# Patient Record
Sex: FEMALE | Race: WHITE | NOT HISPANIC OR LATINO | Employment: OTHER | URBAN - METROPOLITAN AREA
[De-identification: names, ages, dates, MRNs, and addresses within clinical notes are randomized per-mention and may not be internally consistent; named-entity substitution may affect disease eponyms.]

---

## 2017-05-24 ENCOUNTER — OFFICE VISIT - RIVER FALLS (OUTPATIENT)
Dept: FAMILY MEDICINE | Facility: CLINIC | Age: 67
End: 2017-05-24

## 2017-05-24 ASSESSMENT — MIFFLIN-ST. JEOR: SCORE: 1059.06

## 2017-06-05 ENCOUNTER — OFFICE VISIT - RIVER FALLS (OUTPATIENT)
Dept: FAMILY MEDICINE | Facility: CLINIC | Age: 67
End: 2017-06-05

## 2017-06-05 ASSESSMENT — MIFFLIN-ST. JEOR: SCORE: 1049.08

## 2017-10-18 ENCOUNTER — OFFICE VISIT - RIVER FALLS (OUTPATIENT)
Dept: FAMILY MEDICINE | Facility: CLINIC | Age: 67
End: 2017-10-18

## 2017-10-18 ASSESSMENT — MIFFLIN-ST. JEOR: SCORE: 1051.8

## 2017-10-23 ENCOUNTER — OFFICE VISIT - RIVER FALLS (OUTPATIENT)
Dept: FAMILY MEDICINE | Facility: CLINIC | Age: 67
End: 2017-10-23

## 2017-10-23 ENCOUNTER — COMMUNICATION - RIVER FALLS (OUTPATIENT)
Dept: FAMILY MEDICINE | Facility: CLINIC | Age: 67
End: 2017-10-23

## 2017-10-23 ASSESSMENT — MIFFLIN-ST. JEOR: SCORE: 1045.45

## 2017-10-30 ENCOUNTER — OFFICE VISIT - RIVER FALLS (OUTPATIENT)
Dept: FAMILY MEDICINE | Facility: CLINIC | Age: 67
End: 2017-10-30

## 2017-10-30 ASSESSMENT — MIFFLIN-ST. JEOR: SCORE: 1046.36

## 2017-11-01 ENCOUNTER — OFFICE VISIT - RIVER FALLS (OUTPATIENT)
Dept: FAMILY MEDICINE | Facility: CLINIC | Age: 67
End: 2017-11-01

## 2018-06-06 ENCOUNTER — OFFICE VISIT - RIVER FALLS (OUTPATIENT)
Dept: FAMILY MEDICINE | Facility: CLINIC | Age: 68
End: 2018-06-06

## 2018-06-06 ASSESSMENT — MIFFLIN-ST. JEOR: SCORE: 1059.06

## 2018-07-02 ENCOUNTER — OFFICE VISIT - RIVER FALLS (OUTPATIENT)
Dept: FAMILY MEDICINE | Facility: CLINIC | Age: 68
End: 2018-07-02

## 2018-07-02 ASSESSMENT — MIFFLIN-ST. JEOR: SCORE: 1054.52

## 2018-07-20 ENCOUNTER — AMBULATORY - RIVER FALLS (OUTPATIENT)
Dept: FAMILY MEDICINE | Facility: CLINIC | Age: 68
End: 2018-07-20

## 2018-07-21 LAB
CHOLEST SERPL-MCNC: 213 MG/DL
CHOLEST/HDLC SERPL: 2.4 {RATIO}
GLUCOSE BLD-MCNC: 96 MG/DL (ref 65–99)
HDLC SERPL-MCNC: 88 MG/DL
LDLC SERPL CALC-MCNC: 103 MG/DL
NONHDLC SERPL-MCNC: 125 MG/DL
TRIGL SERPL-MCNC: 119 MG/DL

## 2018-08-02 ENCOUNTER — OFFICE VISIT - RIVER FALLS (OUTPATIENT)
Dept: FAMILY MEDICINE | Facility: CLINIC | Age: 68
End: 2018-08-02

## 2018-08-02 ASSESSMENT — MIFFLIN-ST. JEOR: SCORE: 1054.52

## 2018-12-04 ENCOUNTER — OFFICE VISIT - RIVER FALLS (OUTPATIENT)
Dept: FAMILY MEDICINE | Facility: CLINIC | Age: 68
End: 2018-12-04

## 2018-12-04 ASSESSMENT — MIFFLIN-ST. JEOR: SCORE: 1049.98

## 2019-12-17 ENCOUNTER — COMMUNICATION - RIVER FALLS (OUTPATIENT)
Dept: FAMILY MEDICINE | Facility: CLINIC | Age: 69
End: 2019-12-17

## 2020-01-02 ENCOUNTER — COMMUNICATION - RIVER FALLS (OUTPATIENT)
Dept: FAMILY MEDICINE | Facility: CLINIC | Age: 70
End: 2020-01-02

## 2020-01-20 ENCOUNTER — COMMUNICATION - RIVER FALLS (OUTPATIENT)
Dept: FAMILY MEDICINE | Facility: CLINIC | Age: 70
End: 2020-01-20

## 2020-01-20 ENCOUNTER — OFFICE VISIT - RIVER FALLS (OUTPATIENT)
Dept: FAMILY MEDICINE | Facility: CLINIC | Age: 70
End: 2020-01-20

## 2020-01-20 LAB — DEPRECATED S PYO AG THROAT QL EIA: NOT DETECTED

## 2020-01-20 ASSESSMENT — MIFFLIN-ST. JEOR: SCORE: 1071.76

## 2020-02-04 ENCOUNTER — OFFICE VISIT - RIVER FALLS (OUTPATIENT)
Dept: FAMILY MEDICINE | Facility: CLINIC | Age: 70
End: 2020-02-04

## 2020-03-05 ENCOUNTER — AMBULATORY - RIVER FALLS (OUTPATIENT)
Dept: FAMILY MEDICINE | Facility: CLINIC | Age: 70
End: 2020-03-05

## 2020-03-06 ENCOUNTER — COMMUNICATION - RIVER FALLS (OUTPATIENT)
Dept: FAMILY MEDICINE | Facility: CLINIC | Age: 70
End: 2020-03-06

## 2020-03-06 LAB
CHOLEST SERPL-MCNC: 221 MG/DL
CHOLEST/HDLC SERPL: 2.5 {RATIO}
FECAL FAT, QUALITATIVE: NORMAL
GLUCOSE BLD-MCNC: 101 MG/DL (ref 65–99)
HDLC SERPL-MCNC: 87 MG/DL
LDLC SERPL CALC-MCNC: 113 MG/DL
NONHDLC SERPL-MCNC: 134 MG/DL
TRIGL SERPL-MCNC: 100 MG/DL

## 2020-05-05 ENCOUNTER — OFFICE VISIT - RIVER FALLS (OUTPATIENT)
Dept: FAMILY MEDICINE | Facility: CLINIC | Age: 70
End: 2020-05-05

## 2020-06-24 ENCOUNTER — OFFICE VISIT - RIVER FALLS (OUTPATIENT)
Dept: FAMILY MEDICINE | Facility: CLINIC | Age: 70
End: 2020-06-24

## 2020-06-24 ASSESSMENT — MIFFLIN-ST. JEOR: SCORE: 1030.93

## 2020-11-05 ENCOUNTER — OFFICE VISIT - RIVER FALLS (OUTPATIENT)
Dept: FAMILY MEDICINE | Facility: CLINIC | Age: 70
End: 2020-11-05

## 2020-11-05 ENCOUNTER — AMBULATORY - RIVER FALLS (OUTPATIENT)
Dept: FAMILY MEDICINE | Facility: CLINIC | Age: 70
End: 2020-11-05

## 2020-11-08 LAB — SARS-COV-2 RNA SPEC QL NAA+PROBE: NOT DETECTED

## 2020-11-27 ENCOUNTER — AMBULATORY - RIVER FALLS (OUTPATIENT)
Dept: FAMILY MEDICINE | Facility: CLINIC | Age: 70
End: 2020-11-27

## 2021-02-04 ENCOUNTER — OFFICE VISIT - RIVER FALLS (OUTPATIENT)
Dept: FAMILY MEDICINE | Facility: CLINIC | Age: 71
End: 2021-02-04

## 2021-02-04 ASSESSMENT — MIFFLIN-ST. JEOR: SCORE: 1051.8

## 2021-07-26 ENCOUNTER — OFFICE VISIT - RIVER FALLS (OUTPATIENT)
Dept: FAMILY MEDICINE | Facility: CLINIC | Age: 71
End: 2021-07-26

## 2021-07-26 ASSESSMENT — MIFFLIN-ST. JEOR: SCORE: 1040.91

## 2021-08-18 ENCOUNTER — AMBULATORY - RIVER FALLS (OUTPATIENT)
Dept: FAMILY MEDICINE | Facility: CLINIC | Age: 71
End: 2021-08-18

## 2021-08-19 LAB — FECAL FAT, QUALITATIVE: NORMAL

## 2021-08-20 ENCOUNTER — COMMUNICATION - RIVER FALLS (OUTPATIENT)
Dept: FAMILY MEDICINE | Facility: CLINIC | Age: 71
End: 2021-08-20

## 2021-08-23 ENCOUNTER — OFFICE VISIT - RIVER FALLS (OUTPATIENT)
Dept: FAMILY MEDICINE | Facility: CLINIC | Age: 71
End: 2021-08-23

## 2021-08-23 ASSESSMENT — MIFFLIN-ST. JEOR: SCORE: 1036.83

## 2021-10-18 ENCOUNTER — OFFICE VISIT - RIVER FALLS (OUTPATIENT)
Dept: FAMILY MEDICINE | Facility: CLINIC | Age: 71
End: 2021-10-18

## 2021-10-18 ASSESSMENT — MIFFLIN-ST. JEOR: SCORE: 1041.37

## 2021-12-16 ENCOUNTER — OFFICE VISIT - RIVER FALLS (OUTPATIENT)
Dept: FAMILY MEDICINE | Facility: CLINIC | Age: 71
End: 2021-12-16

## 2021-12-16 ASSESSMENT — MIFFLIN-ST. JEOR: SCORE: 1068.13

## 2022-02-12 VITALS
WEIGHT: 127.8 LBS | WEIGHT: 128 LBS | DIASTOLIC BLOOD PRESSURE: 59 MMHG | BODY MASS INDEX: 22.32 KG/M2 | TEMPERATURE: 97.8 F | RESPIRATION RATE: 16 BRPM | SYSTOLIC BLOOD PRESSURE: 95 MMHG | HEART RATE: 56 BPM | HEART RATE: 62 BPM | OXYGEN SATURATION: 98 % | TEMPERATURE: 97.4 F | HEIGHT: 64 IN | BODY MASS INDEX: 21.82 KG/M2 | SYSTOLIC BLOOD PRESSURE: 122 MMHG | DIASTOLIC BLOOD PRESSURE: 60 MMHG

## 2022-02-12 VITALS
TEMPERATURE: 97.6 F | HEART RATE: 56 BPM | HEIGHT: 64 IN | DIASTOLIC BLOOD PRESSURE: 68 MMHG | BODY MASS INDEX: 21.07 KG/M2 | WEIGHT: 122 LBS | SYSTOLIC BLOOD PRESSURE: 116 MMHG | BODY MASS INDEX: 21.27 KG/M2 | WEIGHT: 123.4 LBS | SYSTOLIC BLOOD PRESSURE: 136 MMHG | WEIGHT: 122 LBS | DIASTOLIC BLOOD PRESSURE: 68 MMHG | DIASTOLIC BLOOD PRESSURE: 62 MMHG | HEART RATE: 52 BPM | RESPIRATION RATE: 16 BRPM | SYSTOLIC BLOOD PRESSURE: 120 MMHG | BODY MASS INDEX: 20.86 KG/M2 | DIASTOLIC BLOOD PRESSURE: 64 MMHG | SYSTOLIC BLOOD PRESSURE: 111 MMHG | HEIGHT: 64 IN | HEART RATE: 64 BPM | BODY MASS INDEX: 20.83 KG/M2 | TEMPERATURE: 97.2 F | TEMPERATURE: 97.4 F | WEIGHT: 122.2 LBS | HEIGHT: 64 IN | HEART RATE: 51 BPM

## 2022-02-12 VITALS
HEART RATE: 64 BPM | HEART RATE: 62 BPM | SYSTOLIC BLOOD PRESSURE: 108 MMHG | WEIGHT: 120.1 LBS | SYSTOLIC BLOOD PRESSURE: 110 MMHG | BODY MASS INDEX: 20.51 KG/M2 | TEMPERATURE: 97.1 F | HEIGHT: 64 IN | DIASTOLIC BLOOD PRESSURE: 66 MMHG | BODY MASS INDEX: 20.67 KG/M2 | HEIGHT: 64 IN | TEMPERATURE: 97.7 F | WEIGHT: 121.1 LBS | DIASTOLIC BLOOD PRESSURE: 64 MMHG

## 2022-02-12 VITALS
WEIGHT: 123 LBS | HEART RATE: 57 BPM | BODY MASS INDEX: 21 KG/M2 | SYSTOLIC BLOOD PRESSURE: 125 MMHG | TEMPERATURE: 97.2 F | HEIGHT: 64 IN | DIASTOLIC BLOOD PRESSURE: 77 MMHG

## 2022-02-12 VITALS
DIASTOLIC BLOOD PRESSURE: 63 MMHG | BODY MASS INDEX: 20.96 KG/M2 | SYSTOLIC BLOOD PRESSURE: 96 MMHG | HEART RATE: 56 BPM | HEIGHT: 64 IN | TEMPERATURE: 97.7 F | WEIGHT: 122.8 LBS

## 2022-02-12 VITALS
DIASTOLIC BLOOD PRESSURE: 62 MMHG | HEIGHT: 64 IN | BODY MASS INDEX: 20.66 KG/M2 | SYSTOLIC BLOOD PRESSURE: 110 MMHG | TEMPERATURE: 98 F | HEART RATE: 68 BPM | WEIGHT: 121 LBS

## 2022-02-12 VITALS
DIASTOLIC BLOOD PRESSURE: 60 MMHG | TEMPERATURE: 96.9 F | SYSTOLIC BLOOD PRESSURE: 100 MMHG | WEIGHT: 125 LBS | BODY MASS INDEX: 21.34 KG/M2 | HEIGHT: 64 IN | HEART RATE: 60 BPM

## 2022-02-12 VITALS
HEART RATE: 60 BPM | SYSTOLIC BLOOD PRESSURE: 100 MMHG | TEMPERATURE: 97 F | DIASTOLIC BLOOD PRESSURE: 60 MMHG | BODY MASS INDEX: 21.07 KG/M2 | HEIGHT: 64 IN | WEIGHT: 123.4 LBS

## 2022-02-12 VITALS
DIASTOLIC BLOOD PRESSURE: 58 MMHG | SYSTOLIC BLOOD PRESSURE: 98 MMHG | BODY MASS INDEX: 20.28 KG/M2 | WEIGHT: 118.8 LBS | HEART RATE: 60 BPM | HEIGHT: 64 IN | TEMPERATURE: 97.8 F

## 2022-02-12 VITALS
TEMPERATURE: 97.8 F | HEIGHT: 64 IN | DIASTOLIC BLOOD PRESSURE: 72 MMHG | TEMPERATURE: 97.4 F | TEMPERATURE: 97.4 F | BODY MASS INDEX: 21.34 KG/M2 | WEIGHT: 124 LBS | HEIGHT: 64 IN | WEIGHT: 124 LBS | HEART RATE: 56 BPM | BODY MASS INDEX: 21.17 KG/M2 | SYSTOLIC BLOOD PRESSURE: 120 MMHG | BODY MASS INDEX: 21.17 KG/M2 | HEART RATE: 58 BPM | HEIGHT: 64 IN | SYSTOLIC BLOOD PRESSURE: 122 MMHG | DIASTOLIC BLOOD PRESSURE: 70 MMHG | SYSTOLIC BLOOD PRESSURE: 124 MMHG | DIASTOLIC BLOOD PRESSURE: 64 MMHG | HEART RATE: 58 BPM | WEIGHT: 125 LBS

## 2022-02-12 VITALS
BODY MASS INDEX: 21.68 KG/M2 | HEART RATE: 68 BPM | OXYGEN SATURATION: 97 % | HEIGHT: 64 IN | DIASTOLIC BLOOD PRESSURE: 60 MMHG | WEIGHT: 127 LBS | TEMPERATURE: 97 F | SYSTOLIC BLOOD PRESSURE: 108 MMHG

## 2022-02-12 VITALS — BODY MASS INDEX: 22.32 KG/M2 | HEIGHT: 64 IN

## 2022-02-15 NOTE — NURSING NOTE
Comprehensive Intake Entered On:  1/20/2020 8:55 AM CST    Performed On:  1/20/2020 8:51 AM CST by Khalida Dailey CMA               Summary   Chief Complaint :   c/o  Sore throat with chills x 1 week   Menstrual Status :   Postmenopausal   Weight Measured :   127.8 lb(Converted to: 127 lb 13 oz, 57.97 kg)    Height Measured :   63.5 in(Converted to: 5 ft 3 in, 161.29 cm)    Body Mass Index :   22.28 kg/m2   Body Surface Area :   1.61 m2   Systolic Blood Pressure :   122 mmHg   Diastolic Blood Pressure :   60 mmHg   Mean Arterial Pressure :   81 mmHg   Peripheral Pulse Rate :   62 bpm   Temperature Tympanic :   97.8 DegF(Converted to: 36.6 DegC)  (LOW)    Race :      Ethnicity :   Not  or    Khalida Dailey CMA - 1/20/2020 8:51 AM CST   Health Status   Allergies Verified? :   Yes   Medication History Verified? :   Yes   Pre-Visit Planning Status :   Completed   Tobacco Use? :   Never smoker   Khalida Dailey CMA - 1/20/2020 8:51 AM CST   Consents   Consent for Immunization Exchange :   Consent Granted   Consent for Immunizations to Providers :   Consent Granted   Khalida Dailey CMA - 1/20/2020 8:51 AM CST   Meds / Allergies   (As Of: 1/20/2020 8:55:27 AM CST)   Allergies (Active)   No Known Medication Allergies  Estimated Onset Date:   Unspecified ; Created By:   Ernesto Sparks CMA; Reaction Status:   Active ; Category:   Drug ; Substance:   No Known Medication Allergies ; Type:   Allergy ; Updated By:   Ernesto Sparsk CMA; Reviewed Date:   8/2/2018 4:44 PM CDT        Medication List   (As Of: 1/20/2020 8:55:27 AM CST)   Prescription/Discharge Order    conjugated estrogens  :   conjugated estrogens ; Status:   Prescribed ; Ordered As Mnemonic:   conjugated estrogens 0.3 mg oral tablet ; Simple Display Line:   0.3 mg, 1 tab(s), PO, Daily, 90 tab(s), 0 Refill(s) ; Ordering Provider:   Kirill Nj MD; Catalog Code:   conjugated estrogens ; Order Dt/Tm:   1/2/2020  Off the floor--will check back later 4:56:50 PM CST          conjugated estrogens topical  :   conjugated estrogens topical ; Status:   Prescribed ; Ordered As Mnemonic:   Premarin 0.625 mg/g vaginal cream with applicator ; Simple Display Line:   1 g, VAG, qPM, uses about 2x a week, 42.5 g, 3 Refill(s) ; Ordering Provider:   Kirill Nj MD; Catalog Code:   conjugated estrogens topical ; Order Dt/Tm:   6/6/2018 1:04:08 PM CDT          estradiol-levonorgestrel  :   estradiol-levonorgestrel ; Status:   Prescribed ; Ordered As Mnemonic:   estradiol-levonorgestrel 0.045 mg-0.015 mg/24 hr transdermal film, extended release ; Simple Display Line:   1 patch(es), top, qweek, fill if decides to change from oral, 12 patch(es), 3 Refill(s) ; Ordering Provider:   Kirill Nj MD; Catalog Code:   estradiol-levonorgestrel ; Order Dt/Tm:   6/6/2018 1:02:20 PM CDT          fluticasone nasal  :   fluticasone nasal ; Status:   Prescribed ; Ordered As Mnemonic:   fluticasone 50 mcg/inh nasal spray ; Simple Display Line:   2 spray(s), Nasal, daily, for 30 day(s), 16 gm, 0 Refill(s) ; Ordering Provider:   Kirill Nj MD; Catalog Code:   fluticasone nasal ; Order Dt/Tm:   12/17/2019 3:13:16 PM CST          ibuprofen  :   ibuprofen ; Status:   Prescribed ; Ordered As Mnemonic:   ibuprofen 600 mg oral tablet ; Simple Display Line:   600 mg, 1 tab(s), po, q8 hrs, PRN: as needed for pain, 45 tab(s), 2 Refill(s) ; Ordering Provider:   Kirill Nj MD; Catalog Code:   ibuprofen ; Order Dt/Tm:   5/24/2017 3:03:55 PM CDT          medroxyPROGESTERone  :   medroxyPROGESTERone ; Status:   Prescribed ; Ordered As Mnemonic:   Provera 10 mg oral tablet ; Simple Display Line:   10 mg, 1 tab(s), po, daily, as directed by provider, 12 tab(s), 4 Refill(s) ; Ordering Provider:   Kirill Nj MD; Catalog Code:   medroxyPROGESTERone ; Order Dt/Tm:   11/17/2018 8:37:11 AM CST          metroNIDAZOLE topical  :   metroNIDAZOLE topical ; Status:   Prescribed ; Ordered As Mnemonic:    metroNIDAZOLE 0.75% topical gel ; Simple Display Line:   1 annie, TOP, daily, 45 g, 3 Refill(s) ; Ordering Provider:   Kirill Nj MD; Catalog Code:   metroNIDAZOLE topical ; Order Dt/Tm:   6/6/2018 1:15:04 PM CDT          Miscellaneous Rx Supply  :   Miscellaneous Rx Supply ; Status:   Prescribed ; Ordered As Mnemonic:   Orthotics ; Simple Display Line:   See Instructions, Use as directed, 1 EA, 0 Refill(s) ; Ordering Provider:   Kirill Nj MD; Catalog Code:   Miscellaneous Rx Supply ; Order Dt/Tm:   5/24/2017 3:07:47 PM CDT

## 2022-02-15 NOTE — PROGRESS NOTES
Patient:   LUCHO GOLD            MRN: [id number]            FIN: [id number]               Age:   71 years     Sex:  Female     :  1950   Associated Diagnoses:   Musculoskeletal pain   Author:   Kirill Nj MD      Visit Information      Date of Service: 2021 01:33 pm  Performing Location: [facility]  Encounter#: [id number]      Primary Care Provider (PCP):  Kirill Nj MD    NPI# [id number]      Referring Provider:  Kirill Nj MD    NPI# [id number]      Chief Complaint   2021 1:41 PM CST   referral for PT for right hammstring pain        History of Present Illness   Has high hamstring tendinopathy. Symptoms restarted with overdoing the running circuit. Physical therapy has helped in past. Had been doing well since 2018. Usually takes a couple months with physical therapy to improve.    Right buttock area with pain started 2017 with two months of biking and skiing. Had been doing strengthening exercises past month without some improvement. Had left piriformis syndrome in the past which improved with physical therapy.  Pain occurs with stretching. Running does trigger it as well. Able to run slowly. Feels it when bends forward.  Symptoms resolved last summer. Symptoms recurred two months with running even though had been running all year.      Review of Systems   Musculoskeletal:  No muscle weakness.    Neurologic:  No numbness, No tingling.    No incontinence      Health Status   Allergies:    Allergic Reactions (Selected)  No Known Medication Allergies   Medications:  (Selected)   Prescriptions  Prescribed  Orthotics: Orthotics, See Instructions, Instructions: Use as directed, Supply, # 1 EA, 0 Refill(s), Type: Maintenance  fluticasone 50 mcg/inh nasal spray: = 2 spray(s), Nasal, daily, # 16 gm, 11 Refill(s), Type: Maintenance, Pharmacy: [facility], due for visit for further refills, 2 spray(s) Nasal daily,x30 day(s), 63.5, in, 10/18/21 12:44:00 CDT,  Height Measured, 121.1, lb, 10/18/21 12:44:00 HILDATKan...  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  Comments:  6/6/2013 4:51 PM HILDAT - Marleen Sanz  Low risk for cervical cancer. Repeat 3 yrs (age 65), then if neg may stop pap screening.  Bone density scan (SNOMED CT 770512362) on 4/20/2010 at 59 Years.  Comments:  10/15/2010 9:54 AM HILDAT - Dayan Guzmán - Stable  DEXA - Dual energy X-ray photon absorptiometry (SNOMED CT 849218040) on 4/14/2010 at 59 Years.  Comments:  2/24/2011 10:33 AM Marleen Emerson. Repeat 5 yrs. (Due 2015)  HPV - Human papillomavirus test negative (SNOMED CT 4589420455) on 2/18/2010 at 59 Years.  Comments:  2/24/2011 10:34 AM Marleen Emerson  Low risk for cervical cancer. OK to have pap q 3 yrs.  Colonoscopy (SNOMED CT 934845250) performed by Cristino Knight MD on 3/6/2007 at 56 Years.  Comments:  4/29/2015 3:33 PM CDT - Kymberly IRENE, Rachael  Pt received IV sedation.    2/24/2011 10:31 AM LES - Jaison KOLB, Marleen  Normal. Repeat 10 yrs if no new risk factors. (Due 2017)  Breast biopsy sample (SNOMED CT  0612048146) in the month of 1998 at 47 Years.   section (SNOMED CT 39380370) in  at 36 Years.      Physical Examination   Vital Signs   2021 1:41 PM CST Temperature Tympanic 97 DegF  LOW    Peripheral Pulse Rate 68 bpm    Pulse Site Radial artery    HR Method Electronic    Systolic Blood Pressure 108 mmHg    Diastolic Blood Pressure 60 mmHg    Mean Arterial Pressure 76 mmHg    BP Site Right arm    BP Method Manual    Oxygen Saturation 97 %      Measurements from flowsheet : Measurements   2021 1:41 PM CST Height Measured - Standard 63.5 in    Height/Length Estimated 63.5    Weight Measured - Standard 127 lb    BSA 1.6 m2    Body Mass Index 22.14 kg/m2      General:  Alert and oriented, No acute distress.    Musculoskeletal:  Normal range of motion, Normal strength, Normal gait, tender inferior posterior pelvis.    Neurologic: normal knee DTR   Psychiatric:  Appropriate mood & affect.       Review / Management   MRI pelvis May 2017: Hamstring Tendinopathy moderate high partial thickness and tearing greater on right side  MRI lumbar spine May 2017: L4-L5 central caudal 5mm herniation with L5 encroachment      Impression and Plan   Diagnosis     Musculoskeletal pain (CXU40-BR M79.18).     Likely acute on top of chronic tendinopathy. Refer to physical therapy

## 2022-02-15 NOTE — NURSING NOTE
Comprehensive Intake Entered On:  6/24/2020 10:42 AM CDT    Performed On:  6/24/2020 10:37 AM CDT by Jocelyn Telles CMA   Chief Complaint :   c/o left upper leg pain for the past 3 weeks and not getting better    Menstrual Status :   Postmenopausal   Weight Measured :   118.8 lb(Converted to: 118 lb 13 oz, 53.89 kg)    Height Measured :   63.5 in(Converted to: 5 ft 3 in, 161.29 cm)    Body Mass Index :   20.71 kg/m2   Body Surface Area :   1.55 m2   Height/Length Estimated :   63.5    Systolic Blood Pressure :   98 mmHg   Diastolic Blood Pressure :   58 mmHg (LOW)    Mean Arterial Pressure :   71 mmHg   Peripheral Pulse Rate :   60 bpm   BP Site :   Right arm   Pulse Site :   Radial artery   BP Method :   Manual   HR Method :   Manual   Temperature Tympanic :   97.8 DegF(Converted to: 36.6 DegC)  (LOW)    Race :      Ethnicity :   Not  or    Jocelyn Telles CMA - 6/24/2020 10:37 AM CDT   Health Status   Allergies Verified? :   Yes   Medication History Verified? :   Yes   Medical History Verified? :   No   Pre-Visit Planning Status :   Not completed   Tobacco Use? :   Never smoker   Jocelyn Telles CMA - 6/24/2020 10:37 AM CDT   Consents   Consent for Immunization Exchange :   Consent Granted   Consent for Immunizations to Providers :   Consent Granted   Jocelyn Telles CMA - 6/24/2020 10:37 AM CDT   Meds / Allergies   (As Of: 6/24/2020 10:42:24 AM CDT)   Allergies (Active)   No Known Medication Allergies  Estimated Onset Date:   Unspecified ; Created By:   Ernesto Sparks CMA; Reaction Status:   Active ; Category:   Drug ; Substance:   No Known Medication Allergies ; Type:   Allergy ; Updated By:   Ernesto Sparks CMA; Reviewed Date:   6/24/2020 10:40 AM CDT        Medication List   (As Of: 6/24/2020 10:42:24 AM CDT)   Prescription/Discharge Order    conjugated estrogens  :   conjugated estrogens ; Status:   Prescribed ; Ordered As Mnemonic:   conjugated estrogens 0.3 mg  oral tablet ; Simple Display Line:   0.3 mg, 1 tab(s), PO, Daily, 95 tab(s), 3 Refill(s) ; Ordering Provider:   Kirill Nj MD; Catalog Code:   conjugated estrogens ; Order Dt/Tm:   2/4/2020 2:55:33 PM CST          conjugated estrogens topical  :   conjugated estrogens topical ; Status:   Prescribed ; Ordered As Mnemonic:   Premarin 0.625 mg/g vaginal cream with applicator ; Simple Display Line:   1 g, VAG, qPM, uses about about 1-2x a month but might increase if tapers off oral premarin, 42.5 g, 3 Refill(s) ; Ordering Provider:   Kirill Nj MD; Catalog Code:   conjugated estrogens topical ; Order Dt/Tm:   2/4/2020 2:56:27 PM CST          fluticasone nasal  :   fluticasone nasal ; Status:   Prescribed ; Ordered As Mnemonic:   fluticasone 50 mcg/inh nasal spray ; Simple Display Line:   2 spray(s), Nasal, daily, for 30 day(s), 16 gm, 11 Refill(s) ; Ordering Provider:   Kirill Nj MD; Catalog Code:   fluticasone nasal ; Order Dt/Tm:   2/4/2020 2:57:34 PM CST          ibuprofen  :   ibuprofen ; Status:   Prescribed ; Ordered As Mnemonic:   ibuprofen 600 mg oral tablet ; Simple Display Line:   600 mg, 1 tab(s), po, q8 hrs, PRN: as needed for pain, 45 tab(s), 2 Refill(s) ; Ordering Provider:   Kirill Nj MD; Catalog Code:   ibuprofen ; Order Dt/Tm:   5/24/2017 3:03:55 PM CDT          medroxyPROGESTERone  :   medroxyPROGESTERone ; Status:   Prescribed ; Ordered As Mnemonic:   Provera 10 mg oral tablet ; Simple Display Line:   10 mg, 1 tab(s), po, daily, as directed by provider, 12 tab(s), 4 Refill(s) ; Ordering Provider:   Kirill Nj MD; Catalog Code:   medroxyPROGESTERone ; Order Dt/Tm:   2/4/2020 2:56:02 PM CST          metroNIDAZOLE topical  :   metroNIDAZOLE topical ; Status:   Prescribed ; Ordered As Mnemonic:   metroNIDAZOLE 0.75% topical gel ; Simple Display Line:   1 annie, TOP, daily, 45 g, 3 Refill(s) ; Ordering Provider:   Kirill Nj MD; Catalog Code:   metroNIDAZOLE topical ; Order  Dt/Tm:   2/4/2020 2:56:17 PM CST          Miscellaneous Rx Supply  :   Miscellaneous Rx Supply ; Status:   Prescribed ; Ordered As Mnemonic:   Orthotics ; Simple Display Line:   See Instructions, Use as directed, 1 EA, 0 Refill(s) ; Ordering Provider:   Kirill Nj MD; Catalog Code:   Miscellaneous Rx Supply ; Order Dt/Tm:   5/24/2017 3:07:47 PM CDT            ID Risk Screen   Recent Travel History :   No recent travel   Family Member Travel History :   No recent travel   Other Exposure to Infectious Disease :   Unknown   Jocelyn Telles CMA - 6/24/2020 10:37 AM CDT

## 2022-02-15 NOTE — LETTER
(Inserted Image. Unable to display)   February 19, 2020        LUCHO FOSTER   [address] [place], [place] [id number]        Dear LUCHO,      Thank you for selecting [facility] [[email or web address]] for your healthcare needs.    Our records indicate you are due for the following services:     Fasting Lab Tests ~ Please do not eat or drink anything 10 hours prior to your scheduled appointment time.  (Water and any medications that you may need are allowed unless directed otherwise.)    If you had your labs done at another facility or with Direct Access Lab Testing at [facility], please bring in a copy of the results to your next visit, mail a copy, or drop off a copy of your results to your Healthcare Provider.    To schedule an appointment or if you have further questions, please contact your primary clinic:   [facility]       [phone]   [facility]       [phone]              [facility]     [phone]      Powered by [employer]    Sincerely,    Kirill Nj M.D.

## 2022-02-15 NOTE — NURSING NOTE
Patient called at 1000,  and left a messing asking for an extension on her Premarin.  States normally she sees TAW in late Sept for her annual exam.  She will be in [place] until the end of October.  She would like a refill to get her through, and then she will TAW when she gets back.    Refill sent in and patient notified at 1030.

## 2022-02-15 NOTE — NURSING NOTE
Comprehensive Intake Entered On:  2/4/2021 8:31 AM CST    Performed On:  2/4/2021 8:20 AM CST by Jocelyn Telles CMA   Chief Complaint :   c/o left inner foot pain since Tuesday getting worse    Menstrual Status :   Postmenopausal   Weight Measured :   123.4 lb(Converted to: 123 lb 6 oz, 55.973 kg)    Height Measured :   63.5 in(Converted to: 5 ft 3 in, 161.29 cm)    Body Mass Index :   21.51 kg/m2   Body Surface Area :   1.58 m2   Height/Length Estimated :   63.5    Systolic Blood Pressure :   100 mmHg   Diastolic Blood Pressure :   60 mmHg   Mean Arterial Pressure :   73 mmHg   Peripheral Pulse Rate :   60 bpm   BP Site :   Right arm   Pulse Site :   Radial artery   BP Method :   Manual   HR Method :   Manual   Temperature Tympanic :   97 DegF(Converted to: 36.1 DegC)  (LOW)    Race :      Ethnicity :   Not  or    Jocelyn Telles CMA - 2/4/2021 8:20 AM CST   Health Status   Allergies Verified? :   Yes   Medication History Verified? :   Yes   Medical History Verified? :   No   Pre-Visit Planning Status :   Not completed   Tobacco Use? :   Former smoker   Jocelyn Telles CMA - 2/4/2021 8:20 AM CST   Consents   Consent for Immunization Exchange :   Consent Granted   Consent for Immunizations to Providers :   Consent Granted   Jocelyn Telles CMA - 2/4/2021 8:20 AM CST   Meds / Allergies   (As Of: 2/4/2021 8:31:42 AM CST)   Allergies (Active)   No Known Medication Allergies  Estimated Onset Date:   Unspecified ; Created By:   Ernesto Sparks CMA; Reaction Status:   Active ; Category:   Drug ; Substance:   No Known Medication Allergies ; Type:   Allergy ; Updated By:   Ernesto Sparks CMA; Reviewed Date:   2/4/2021 8:26 AM CST        Medication List   (As Of: 2/4/2021 8:31:42 AM CST)   Prescription/Discharge Order    conjugated estrogens  :   conjugated estrogens ; Status:   Prescribed ; Ordered As Mnemonic:   conjugated estrogens 0.3 mg oral tablet ; Simple Display Line:    0.3 mg, 1 tab(s), PO, Daily, 95 tab(s), 3 Refill(s) ; Ordering Provider:   Kirill Nj MD; Catalog Code:   conjugated estrogens ; Order Dt/Tm:   2/4/2020 2:55:33 PM CST          conjugated estrogens topical  :   conjugated estrogens topical ; Status:   Prescribed ; Ordered As Mnemonic:   Premarin 0.625 mg/g vaginal cream with applicator ; Simple Display Line:   1 g, VAG, qPM, uses about about 1-2x a month but might increase if tapers off oral premarin, 42.5 g, 3 Refill(s) ; Ordering Provider:   Kirill Nj MD; Catalog Code:   conjugated estrogens topical ; Order Dt/Tm:   2/4/2020 2:56:27 PM CST          fluticasone nasal  :   fluticasone nasal ; Status:   Prescribed ; Ordered As Mnemonic:   fluticasone 50 mcg/inh nasal spray ; Simple Display Line:   2 spray(s), Nasal, daily, for 30 day(s), 16 gm, 11 Refill(s) ; Ordering Provider:   Kirill Nj MD; Catalog Code:   fluticasone nasal ; Order Dt/Tm:   2/4/2020 2:57:34 PM CST          ibuprofen  :   ibuprofen ; Status:   Prescribed ; Ordered As Mnemonic:   ibuprofen 600 mg oral tablet ; Simple Display Line:   600 mg, 1 tab(s), po, q8 hrs, PRN: as needed for pain, 45 tab(s), 2 Refill(s) ; Ordering Provider:   Kirill Nj MD; Catalog Code:   ibuprofen ; Order Dt/Tm:   5/24/2017 3:03:55 PM CDT          medroxyPROGESTERone  :   medroxyPROGESTERone ; Status:   Prescribed ; Ordered As Mnemonic:   Provera 10 mg oral tablet ; Simple Display Line:   10 mg, 1 tab(s), po, daily, as directed by provider, 12 tab(s), 4 Refill(s) ; Ordering Provider:   Kirill Nj MD; Catalog Code:   medroxyPROGESTERone ; Order Dt/Tm:   2/4/2020 2:56:02 PM CST          metroNIDAZOLE topical  :   metroNIDAZOLE topical ; Status:   Prescribed ; Ordered As Mnemonic:   metroNIDAZOLE 0.75% topical gel ; Simple Display Line:   1 annie, TOP, daily, 45 g, 3 Refill(s) ; Ordering Provider:   Kirill Nj MD; Catalog Code:   metroNIDAZOLE topical ; Order Dt/Tm:   2/4/2020 2:56:17 PM CST           Miscellaneous Rx Supply  :   Miscellaneous Rx Supply ; Status:   Prescribed ; Ordered As Mnemonic:   Orthotics ; Simple Display Line:   See Instructions, Use as directed, 1 EA, 0 Refill(s) ; Ordering Provider:   Kirill Nj MD; Catalog Code:   Miscellaneous Rx Supply ; Order Dt/Tm:   5/24/2017 3:07:47 PM CDT            ID Risk Screen   Recent Travel History :   No recent travel   Family Member Travel History :   No recent travel   Other Exposure to Infectious Disease :   Unknown   COVID-19 Testing Status :   No positive COVID-19 test   Jocelyn Telles CMA - 2/4/2021 8:20 AM CST   Social History   Social History   (As Of: 2/4/2021 8:31:42 AM CST)   Alcohol:  Current      Current, Wine (5 oz), 6 times per week, 1 drinks/episode average.  2 drinks/episode maximum.   (Last Updated: 10/28/2016 3:57:58 PM CDT by Carol Mendes)          Tobacco:  Past      Former smoker, quit more than 30 days ago   (Last Updated: 2/4/2021 8:22:05 AM CST by Jocelyn Telles CMA)          Electronic Cigarette/Vaping:        Electronic Cigarette Use: Never.   (Last Updated: 2/4/2021 8:22:11 AM CST by Jocelyn Telles CMA)          Substance Abuse:  Denies Substance Abuse      Never   (Last Updated: 8/26/2015 11:41:41 AM CDT by Mercedez Herman RN)          Employment/School:        Retired   (Last Updated: 8/26/2015 11:41:33 AM CDT by Mercedez Herman RN)          Home/Environment:        Spouse/Partner name: Gilberto Partida.  1 children.   (Last Updated: 8/26/2015 11:41:26 AM CDT by Mercedez Herman RN)          Nutrition/Health:         Comments:  8/26/2015 11:40 AM - Mercedez Herman RN: Mostly gluten Free   (Last Updated: 8/26/2015 11:40:48 AM CDT by Mercedez Herman RN)          Exercise:  Regular exercise      Exercise frequency: 5-6 times/week.  Exercise type: Bicycling, Running, Swimming, Weight lifting, Yoga.   (Last Updated: 8/27/2015 4:18:40 PM CDT by Carol Mendes)          Sexual:        Sexually active:  Yes.   (Last Updated: 2/24/2011 10:27:36 AM CST by Marti Hooper CMA)

## 2022-02-15 NOTE — TELEPHONE ENCOUNTER
---------------------  From: Mary Jo Moreau   Sent: 11/8/2020 11:42:30 AM CST  Subject: Result: COVID-19     Spoke with patient at 11:42am regarding COVID-19 test.  Result is NEGATIVE.  Patient has no questions or concerns.

## 2022-02-15 NOTE — PROGRESS NOTES
Chief Complaint    Thinks that she might have a corn on her left foot.  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 It is painful walking barefoot. Improved with shoes. Now has new orthotics.  Review of Systems      No weakness in the foot  Physical Exam   Vitals & Measurements    T: 97.6(Tympanic)  HR: 56(Peripheral)  BP: 120/68     HT: 63.5 in  WT: 122.2 lb  BMI: 21.3       Skin: Corn present at the third MTP joint of the left foot.  It is tender.       Muscle skeletal: Normal flexion extension foot  Assessment/Plan   Left foot pain: Likely from:.  She will work on salicylic acid with soaking and pumice stone.  She has new orthotics. Follow up if not improving. Encourage cross training.  Patient Information     Name:LUCHO GOLD      Address:      [address] [address]     Sex:Female     YOB: 1950     [phone]     Emergency Contact:AMANDA EMMANUEL     MRN:[id number]     FIN:[id number]     Location:[facility]     Date of Service:10/30/2017      Primary Care Physician:       Kirill Nj MD, [phone]  Medications    conjugated estrogens 0.3 mg oral tablet, 0.3 mg= 1 tab(s), po, daily    Flagyl 500 mg oral tablet, 500 mg= 1 tab(s), po, q12 hrs    fluticasone 50 mcg/inh nasal spray, 2 spray(s), nasal, daily, 5 refills    ibuprofen 600 mg oral tablet, 600 mg= 1 tab(s), po, q8 hrs, PRN, 2 refills    MetroGel 0.75% topical, 1 annie, top, daily, 3 refills    Orthotics, See Instructions    Premarin 0.625 mg/g vaginal cream with applicator, 1 gm, vag, qpm, 3 refills    Premarin 0.625 mg/g vaginal cream with applicator, See Instructions, 3 refills    Provera 10 mg oral tablet, 10 mg= 1 tab(s), po, daily, 4 refills  Allergies    No Known Medication Allergies

## 2022-02-15 NOTE — PROGRESS NOTES
Patient:   LUCHO GOLD            MRN: [id number]            FIN: [id number]               Age:   69 years     Sex:  Female     :  1950   Associated Diagnoses:   Leg pain   Author:   Kirill Nj MD      Visit Information      Date of Service: 2020 10:14 am  Performing Location: [facility]  Encounter#: [id number]      Primary Care Provider (PCP):  Kirill Nj MD    NPI# [id number]      Referring Provider:  Kirill Nj MD, NPI# [id number]      Chief Complaint   2020 10:37 AM CDT   c/o left upper leg pain for the past 3 weeks and not getting better      History of Present Illness   Started having lateral left leg pain about 3 weeks ago. Had been running and biking more. May have increased the amount quickly. Pain happens when starts to run. Denies trauma. Feels like in a muscle and not the joint. No pain at rest. Had been sitting a lot with zoom calls. Started ibuprofen a week ago 200mg 3x times daily. No radiation down the leg.         Review of Systems   Constitutional:  No fever.    Gastrointestinal:  No vomiting.    No leg weakness  No numbness or tingling      Health Status   Allergies:    Allergic Reactions (Selected)  No Known Medication Allergies   Medications:  (Selected)   Prescriptions  Prescribed  Orthotics: Orthotics, See Instructions, Instructions: Use as directed, Supply, # 1 EA, 0 Refill(s), Type: Maintenance  Premarin 0.625 mg/g vaginal cream with applicator: ( 1 g ), VAG, qPM, Instructions: uses about about 1-2x a month but might increase if tapers off oral premarin, # 42.5 g, 3 Refill(s), Type: Maintenance, Pharmacy: [facility], 1 gm Vaginal qpm,Instr:uses about about 1-2x a month but might increase if...  Provera 10 mg oral tablet: = 1 tab(s) ( 10 mg ), po, daily, Instructions: as directed by provider, # 12 tab(s), 4 Refill(s), Type: Maintenance, Pharmacy: [facility], 1 tab(s) Oral daily,Instr:as directed by provider  conjugated estrogens 0.3 mg  oral tablet: = 1 tab(s) ( 0.3 mg ), PO, Daily, # 95 tab(s), 3 Refill(s), Type: Maintenance, Pharmacy: [facility], 1 tab(s) Oral daily  fluticasone 50 mcg/inh nasal spray: = 2 spray(s), Nasal, daily, # 16 gm, 11 Refill(s), Type: Maintenance, Pharmacy: [facility], due for visit for further refills, 2 spray(s) Nasal daily,x30 day(s)  ibuprofen 600 mg oral tablet: 1 tab(s) ( 600 mg ), po, q8 hrs, PRN: as needed for pain, # 45 tab(s), 2 Refill(s), Type: Maintenance, Pharmacy: [facility], 1 tab(s) po q8 hrs,PRN:as needed for pain  metroNIDAZOLE 0.75% topical gel: 1 annie, TOP, daily, # 45 g, 3 Refill(s), Type: Maintenance, Pharmacy: [facility], 1 annie Topical daily   Problem list:    All Problems  History of rosacea / SNOMED CT 4242451293 / Confirmed  Prediabetes / SNOMED CT 0434142229 / Confirmed  Tobacco Abuse, in Remission / ICD-9-.1 / Confirmed  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  Comments:  6/6/2013 4:51 PM [facility] - Marleen Sanz  Low risk for cervical cancer. Repeat 3 yrs (age 65), then if neg may stop pap screening.  Bone density scan (SNOMED CT 765217192) on 4/20/2010 at 59 Years.  Comments:  10/15/2010 9:54 AM CDT - Dayan Guzmán  Normal - Stable  DEXA - Dual energy X-ray photon absorptiometry (SNOMED CT 724175533) on 4/14/2010 at 59 Years.  Comments:  2/24/2011 10:33 AM Marleen Emerson  Normal. Repeat 5 yrs. (Due 2015)  HPV - Human papillomavirus test negative (SNOMED CT 7416544069) on 2/18/2010 at 59 Years.  Comments:  2/24/2011 10:34 AM Marleen Emerson  Low risk for cervical cancer. OK to have pap q 3 yrs.  Colonoscopy (SNOMED CT 121331589)  performed by Cristino Knight MD on 3/6/2007 at 56 Years.  Comments:  2015 3:33 PM CDT - Kymberly IRENE, Rachael  Pt received IV sedation.    2011 10:31 AM CST - Jaison KOLB, Marleen  Normal. Repeat 10 yrs if no new risk factors. (Due 2017)  Breast biopsy sample (SNOMED CT 8955534488) in the month of 1998 at 47 Years.   section (SNOMED CT 15032701) in  at 36 Years.      Physical Examination   Vital Signs   2020 10:37 AM CDT Temperature Tympanic 97.8 DegF  LOW    Peripheral Pulse Rate 60 bpm    Pulse Site Radial artery    HR Method Manual    Systolic Blood Pressure 98 mmHg    Diastolic Blood Pressure 58 mmHg  LOW    Mean Arterial Pressure 71 mmHg    BP Site Right arm    BP Method Manual      Measurements from flowsheet : Measurements   2020 10:37 AM CDT Height Measured - Standard 63.5 in    Height/Length Estimated 63.5    Weight Measured - Standard 118.8 lb    BSA 1.55 m2    Body Mass Index 20.71 kg/m2      General:  Alert and oriented, No acute distress.    Musculoskeletal:  Normal range of motion, Normal strength, Normal gait, tender inferior left posterior buttock.    Neurologic:  Normal deep tendon reflexes.       Impression and Plan   Diagnosis     Leg pain (KUS17-BT M79.606).     Muscle strengthening exercises, Physical Therapy and ibuprofen (does not tolerate more than 200mg at a time) with food for 2 weeks. Follow up if not improving

## 2022-02-15 NOTE — TELEPHONE ENCOUNTER
---------------------  From: Kirill Nj MD   To: LUCHO GOLD LORA    Sent: 3/6/2020 3:21:57 PM CST  Subject: Labs     Dear Mrs Gold    Glucose in the prediabetes (100-125) range  Cholesterol stable with risk of heart attack 4.5% over the next 10 years  Stool test negative.    Results:  Date Result Name Ind Value Ref Range   3/5/2020 8:02 AM Glucose Level ((H)) 101 mg/dL (65 - 99)   3/5/2020 8:02 AM Cholesterol ((H)) 221 mg/dL ( - <200)   3/5/2020 8:02 AM Non-HDL Cholesterol ((H)) 134 ( - <130)   3/5/2020 8:02 AM HDL  87 mg/dL (> OR = 50 - )   3/5/2020 8:02 AM Cholesterol/HDL Ratio  2.5 ( - <5.0)   3/5/2020 8:02 AM LDL ((H)) 113    3/5/2020 8:02 AM Triglyceride  100 mg/dL ( - <150)   3/5/2020 1:41 PM Fecal Globin  See comment      Werner Nj MD

## 2022-02-15 NOTE — TELEPHONE ENCOUNTER
---------------------  From: Kirill Nj MD   To: Carol Mendes; Jocelyn Telles CMA;     Sent: 3/9/2020 10:35:01 AM CDT  Subject: RE: General Message     Jocelyn  Call her and make sure she received the message    ---------------------  From: Carol Mendes   To: Kirill Nj MD;     Sent: 3/9/2020 10:31:11 AM CDT  Subject: General Message     Dr. Nj,  I'm not sure if this is an issue or not, but your communication to the patient on her 3/5/20 labs is under Population Management. I am not sure if that is something that goes through the patient portal or not??? Just making you aware of this.  Thank youTried calling pt, no answer, asked for her to call backPatient called back at 1505 stating that she did receive portal message.

## 2022-02-15 NOTE — PROGRESS NOTES
Patient:   LUCHO GOLD            MRN: [id number]            FIN: [id number]               Age:   67 years     Sex:  Female     :  1950   Associated Diagnoses:   Allergic rhinitis; History of rosacea; Menopausal state   Author:   Kirill Nj MD      Visit Information      Date of Service: 2018 12:36 pm  Performing Location: [facility]  Encounter#: [id number]      Primary Care Provider (PCP):  Kirill Nj MD    NPI# [id number]      Referring Provider:  Kirill Nj MD    NPI# [id number]      Chief Complaint   2018 12:37 PM CDT    Medication refills/concerns.  Check to see if she still has tampon in vagina.        History of Present Illness   Back pain improved with gluteal exercises.    Swimming in the chlorine and river makes her sneeze. Uses flonase before swimming. Gets congested if does not use it.  Nose runs alot and more in the morning. Sometimes goes thru a box of kleenex in a couple hours. Happens couple times a week.    Takes premarin everyday for hot flashes and vaginal dryness. Uses provera 3x a year. At end of period last week and concerned used a tampon on Saturday but could not find it to take it out.      Health Status   Allergies:    Allergic Reactions (Selected)  No Known Medication Allergies   Medications:  (Selected)   Prescriptions  Prescribed  MetroGel 0.75% topical: 1 annie, TOP, daily, # 45 g, 3 Refill(s), Type: Maintenance, Pharmacy: [facility], 1 annie top daily  Orthotics: Orthotics, See Instructions, Instructions: Use as directed, Supply, # 1 EA, 0 Refill(s), Type: Maintenance  Premarin 0.625 mg/g vaginal cream with applicator: ( 1 g ), VAG, qPM, Instructions: start daily for 2 weeks and then 2x a week, # 42.5 g, 3 Refill(s), Type: Maintenance, Pharmacy: [facility], 1 gm vag qpm,Instr:start daily for 2 weeks and then 2x a week  Premarin 0.625 mg/g vaginal cream with applicator: See Instructions, Instructions: Apply topically 2-3 x's/week & if  needed INSERT 1/4 TO 1/2 APPLICATORFUL AT BEDTIME TWICE WEEKLY, # 42 gm, 3 Refill(s), Pharmacy: [facility], Due for Physical, Apply topically 2-3 x's/week & if needed INSERT 1/4 TO 1/...  Provera 10 mg oral tablet: 1 tab(s) ( 10 mg ), po, daily, Instructions: as directed by provider, # 12 tab(s), 4 Refill(s), Type: Maintenance, Pharmacy: [facility], 1 tab(s) po daily,Instr:as directed by provider  conjugated estrogens 0.3 mg oral tablet: 1 tab(s) ( 0.3 mg ), PO, Daily, # 90 tab(s), 3 Refill(s), Type: Maintenance, Pharmacy: [facility], Appoinement scheduled for 9/27, 1 tab(s) po daily  fluticasone 50 mcg/inh nasal spray: 2 spray(s), nasal, daily, # 2 EA, 5 Refill(s), Type: Maintenance, Pharmacy: [facility], will need an appt for further refills., 2 spray(s) nasal daily  ibuprofen 600 mg oral tablet: 1 tab(s) ( 600 mg ), po, q8 hrs, PRN: as needed for pain, # 45 tab(s), 2 Refill(s), Type: Maintenance, Pharmacy: [facility], 1 tab(s) po q8 hrs,PRN:as needed for pain  metroNIDAZOLE 500 mg oral tablet: 1 tab(s) ( 500 mg ), po, q12 hrs, Instructions: TAKE ONE TABLET BY MOUTH EVERY TWELVE HOURS for 7 days, # 14 tab(s), 0 Refill(s), Type: Maintenance, Pharmacy: [facility], 1 tab(s) po q12 hrs,Instr:TAKE ONE TABLET BY MOUTH EVERY TWELVE HOURS for 7 days...   Problem list:    All Problems  History of rosacea / SNOMED CT 6915191100 / Confirmed  Tobacco Abuse, in Remission / ICD-9-.1 / Confirmed  Urethral syndrome / SNOMED CT 43948046 / Confirmed  Inactive: ASCUS on PAP Smear / ICD-9-.01  Resolved: History of chicken pox / SNOMED CT 487912767  Canceled: Family history of diabetes mellitus type II / ICD-9-CM V18.0      Histories   Procedure history:    HPV - Human papillomavirus test negative (SNOMED CT 3189993230) on 5/30/2013 at 62 Years.  Comments:  6/6/2013 4:51 PM - Marleen Sanz  Low risk for cervical cancer. Repeat 3 yrs (age 65), then if neg may stop pap screening.  Bone density scan  (SNOMED CT 411469089) on 2010 at 59 Years.  Comments:  10/15/2010 9:54 AM - Dayan Guzmán  Normal - Stable  DEXA - Dual energy X-ray photon absorptiometry (SNOMED CT 193205047) on 2010 at 59 Years.  Comments:  2011 10:33 AM - Jaison ANDREA-CMarleen  Normal. Repeat 5 yrs. (Due )  HPV - Human papillomavirus test negative (SNOMED CT 5326847551) on 2010 at 59 Years.  Comments:  2011 10:34 AM - Jaison ANDREA-CMarleen  Low risk for cervical cancer. OK to have pap q 3 yrs.  Colonoscopy (SNOMED CT 979605165) performed by Cristino Knight MD on 3/6/2007 at 56 Years.  Comments:  2015 3:33 PM - Rachael Traylor MA  Pt received IV sedation.    2011 10:31 AM - Jaison ANDREA-CMarleen  Normal. Repeat 10 yrs if no new risk factors. (Due )  Breast biopsy sample (SNOMED CT 6769363636) in the month of 1998 at 47 Years.   section (SNOMED CT 39591314) in  at 36 Years.   Social History:    Family History: Mom  age 89. Dad  age 63 Sarcoidosis of the liver      Physical Examination   Vital Signs   2018 12:37 PM CDT Temperature Tympanic 97.8 DegF  LOW    Peripheral Pulse Rate 58 bpm  LOW    Pulse Site Radial artery    HR Method Manual    Systolic Blood Pressure 124 mmHg    Diastolic Blood Pressure 72 mmHg    Mean Arterial Pressure 89 mmHg    BP Site Right arm    BP Method Manual      Measurements from flowsheet : Measurements   2018 12:37 PM CDT Height Measured - Standard 63.5 in    Weight Measured - Standard 125 lb    BSA 1.59 m2    Body Mass Index 21.79 kg/m2      General:  Alert and oriented, No acute distress.    Neck:  No lymphadenopathy.    Respiratory:  Lungs are clear to auscultation.    Cardiovascular:  Normal rate.    Gastrointestinal:  Soft, Non-tender.    Musculoskeletal:  Normal gait.    Psychiatric:  Normal judgment.    Breast: no masses or lumps  Pelvic: internal/external genitalia normal. Vaginal and cervical mucosa normal. No enlargement of  ovaries or uterus. No tampon present and bimaunal performed to the posterior vaginal wall      Impression and Plan   Diagnosis     Allergic rhinitis (ZEM90-XB J30.89).     History of rosacea (WSC54-QM Z87.2).     Menopausal state (HSW64-LR N95.1).       Allergic rhinits: uses flonase twice a week and claritin when needed  Rosacea: refill metrogel no longer uses azelaic acid  Menopausal management: would like to switch to transdermal estradiol/levonorgesterol  Colon cancer screening: Normal Colonoscopy 2007. Average risk and desires FIT testing  Cardiac: arrange fasting labs  Breast cancer screening: normal breast exam and discussed mammograms (plans to continue for now)  Immunizations: Td 2016. Pneumovax 2015. Prevnar today. Zostavax discussed      Professional Services   Greater than 25 minutes spent with patient majority counseling

## 2022-02-15 NOTE — TELEPHONE ENCOUNTER
---------------------  From: Kirill Jackson MD   To: LUCHO GOLD    Sent: 8/20/2021 8:18:28 AM CDT  Subject: Patient Message - Results Notification      The test was negative for blood in the stool.  Please repeat in one year.      Results:  Date Result Name Value   8/16/2021 12:00 AM Fecal Globin See comment

## 2022-02-15 NOTE — NURSING NOTE
Comprehensive Intake Entered On:  5/5/2020 2:36 PM CDT    Performed On:  5/5/2020 2:23 PM CDT by Adriana Woods CMA   Chief Complaint :   Patient c/o clear vaginal discharge since February. No color, itching, odor or urinary concerns. Verbal consent granted for video visit.   Menstrual Status :   Postmenopausal   Height Measured :   63.5 in(Converted to: 5 ft 3 in, 161.29 cm)    Height/Length Estimated :   63.5    Race :      Ethnicity :   Not  or    Adriana Woods CMA - 5/5/2020 2:23 PM CDT   Health Status   Allergies Verified? :   Yes   Medication History Verified? :   Yes   Medical History Verified? :   Yes   Pre-Visit Planning Status :   Completed   Tobacco Use? :   Never smoker   Adriana Woods CMA - 5/5/2020 2:23 PM CDT   Consents   Consent for Immunization Exchange :   Consent Granted   Consent for Immunizations to Providers :   Consent Granted   Adriana Woods CMA - 5/5/2020 2:23 PM CDT   Meds / Allergies   (As Of: 5/5/2020 2:36:46 PM CDT)   Allergies (Active)   No Known Medication Allergies  Estimated Onset Date:   Unspecified ; Created By:   Ernesto Sparks CMA; Reaction Status:   Active ; Category:   Drug ; Substance:   No Known Medication Allergies ; Type:   Allergy ; Updated By:   Ernesto Sparks CMA; Reviewed Date:   5/5/2020 2:27 PM CDT        Medication List   (As Of: 5/5/2020 2:36:47 PM CDT)   Prescription/Discharge Order    conjugated estrogens  :   conjugated estrogens ; Status:   Prescribed ; Ordered As Mnemonic:   conjugated estrogens 0.3 mg oral tablet ; Simple Display Line:   0.3 mg, 1 tab(s), PO, Daily, 95 tab(s), 3 Refill(s) ; Ordering Provider:   Kirill Nj MD; Catalog Code:   conjugated estrogens ; Order Dt/Tm:   2/4/2020 2:55:33 PM CST          conjugated estrogens topical  :   conjugated estrogens topical ; Status:   Prescribed ; Ordered As Mnemonic:   Premarin 0.625 mg/g vaginal cream with applicator ; Simple Display Line:   1 g,  VAG, qPM, uses about about 1-2x a month but might increase if tapers off oral premarin, 42.5 g, 3 Refill(s) ; Ordering Provider:   Kirill Nj MD; Catalog Code:   conjugated estrogens topical ; Order Dt/Tm:   2/4/2020 2:56:27 PM CST          fluticasone nasal  :   fluticasone nasal ; Status:   Prescribed ; Ordered As Mnemonic:   fluticasone 50 mcg/inh nasal spray ; Simple Display Line:   2 spray(s), Nasal, daily, for 30 day(s), 16 gm, 11 Refill(s) ; Ordering Provider:   Kirill Nj MD; Catalog Code:   fluticasone nasal ; Order Dt/Tm:   2/4/2020 2:57:34 PM CST          ibuprofen  :   ibuprofen ; Status:   Prescribed ; Ordered As Mnemonic:   ibuprofen 600 mg oral tablet ; Simple Display Line:   600 mg, 1 tab(s), po, q8 hrs, PRN: as needed for pain, 45 tab(s), 2 Refill(s) ; Ordering Provider:   Kirill Nj MD; Catalog Code:   ibuprofen ; Order Dt/Tm:   5/24/2017 3:03:55 PM CDT          medroxyPROGESTERone  :   medroxyPROGESTERone ; Status:   Prescribed ; Ordered As Mnemonic:   Provera 10 mg oral tablet ; Simple Display Line:   10 mg, 1 tab(s), po, daily, as directed by provider, 12 tab(s), 4 Refill(s) ; Ordering Provider:   Kirill Nj MD; Catalog Code:   medroxyPROGESTERone ; Order Dt/Tm:   2/4/2020 2:56:02 PM CST          metroNIDAZOLE topical  :   metroNIDAZOLE topical ; Status:   Prescribed ; Ordered As Mnemonic:   metroNIDAZOLE 0.75% topical gel ; Simple Display Line:   1 anine, TOP, daily, 45 g, 3 Refill(s) ; Ordering Provider:   Kirill Nj MD; Catalog Code:   metroNIDAZOLE topical ; Order Dt/Tm:   2/4/2020 2:56:17 PM CST          Miscellaneous Rx Supply  :   Miscellaneous Rx Supply ; Status:   Prescribed ; Ordered As Mnemonic:   Orthotics ; Simple Display Line:   See Instructions, Use as directed, 1 EA, 0 Refill(s) ; Ordering Provider:   Kirill Nj MD; Catalog Code:   Miscellaneous Rx Supply ; Order Dt/Tm:   5/24/2017 3:07:47 PM CDT            ID Risk Screen   Recent Travel History :    Last travel within 21 days   Family Member Travel History :   Last travel within 21 days   Other Exposure to Infectious Disease :   Unknown   Adriana Woods CMA - 5/5/2020 2:23 PM CDT

## 2022-02-15 NOTE — NURSING NOTE
Comprehensive Intake Entered On:  2/4/2020 2:29 PM CST    Performed On:  2/4/2020 2:22 PM CST by Adriana Woods CMA               Summary   Respiratory Rate :   16 br/min   Adriana Woods CMA - 2/4/2020 2:30 PM CST   Chief Complaint :   Patient presents with cough x3 weeks. Requesting PT for plantar faciitis in left foot.   Menstrual Status :   Postmenopausal   Weight Measured :   128 lb(Converted to: 128 lb 0 oz, 58.06 kg)    Height/Length Estimated :   63.5    Systolic Blood Pressure :   95 mmHg   Diastolic Blood Pressure :   59 mmHg (LOW)    Mean Arterial Pressure :   71 mmHg   Peripheral Pulse Rate :   56 bpm (LOW)    BP Site :   Right arm   BP Method :   Electronic   Temperature Tympanic :   97.4 DegF(Converted to: 36.3 DegC)  (LOW)    Oxygen Saturation :   98 %   Race :      Ethnicity :   Not  or    Adriana Woods CMA - 2/4/2020 2:22 PM CST   Health Status   Allergies Verified? :   Yes   Medication History Verified? :   Yes   Medical History Verified? :   Yes   Pre-Visit Planning Status :   Completed   Tobacco Use? :   Never smoker   Adriana Woods CMA - 2/4/2020 2:22 PM CST   Consents   Consent for Immunization Exchange :   Consent Granted   Consent for Immunizations to Providers :   Consent Granted   Adriana Woods CMA - 2/4/2020 2:22 PM CST   Meds / Allergies   (As Of: 2/4/2020 2:29:40 PM CST)   Allergies (Active)   No Known Medication Allergies  Estimated Onset Date:   Unspecified ; Created By:   Ernesto Sparks CMA; Reaction Status:   Active ; Category:   Drug ; Substance:   No Known Medication Allergies ; Type:   Allergy ; Updated By:   Ernesto Sparks CMA; Reviewed Date:   2/4/2020 2:26 PM CST        Medication List   (As Of: 2/4/2020 2:29:40 PM CST)   Prescription/Discharge Order    conjugated estrogens  :   conjugated estrogens ; Status:   Prescribed ; Ordered As Mnemonic:   conjugated estrogens 0.3 mg oral tablet ; Simple Display Line:   0.3 mg, 1 tab(s), PO, Daily, 90  tab(s), 0 Refill(s) ; Ordering Provider:   Kirill Nj MD; Catalog Code:   conjugated estrogens ; Order Dt/Tm:   1/2/2020 4:56:50 PM CST          conjugated estrogens topical  :   conjugated estrogens topical ; Status:   Prescribed ; Ordered As Mnemonic:   Premarin 0.625 mg/g vaginal cream with applicator ; Simple Display Line:   1 g, VAG, qPM, uses about 2x a week, 42.5 g, 3 Refill(s) ; Ordering Provider:   Kirill Nj MD; Catalog Code:   conjugated estrogens topical ; Order Dt/Tm:   6/6/2018 1:04:08 PM CDT          estradiol-levonorgestrel  :   estradiol-levonorgestrel ; Status:   Prescribed ; Ordered As Mnemonic:   estradiol-levonorgestrel 0.045 mg-0.015 mg/24 hr transdermal film, extended release ; Simple Display Line:   1 patch(es), top, qweek, fill if decides to change from oral, 12 patch(es), 3 Refill(s) ; Ordering Provider:   Kirill Nj MD; Catalog Code:   estradiol-levonorgestrel ; Order Dt/Tm:   6/6/2018 1:02:20 PM CDT          fluticasone nasal  :   fluticasone nasal ; Status:   Prescribed ; Ordered As Mnemonic:   fluticasone 50 mcg/inh nasal spray ; Simple Display Line:   2 spray(s), Nasal, daily, for 30 day(s), 16 gm, 0 Refill(s) ; Ordering Provider:   Kirill Nj MD; Catalog Code:   fluticasone nasal ; Order Dt/Tm:   12/17/2019 3:13:16 PM CST          ibuprofen  :   ibuprofen ; Status:   Prescribed ; Ordered As Mnemonic:   ibuprofen 600 mg oral tablet ; Simple Display Line:   600 mg, 1 tab(s), po, q8 hrs, PRN: as needed for pain, 45 tab(s), 2 Refill(s) ; Ordering Provider:   Kirill Nj MD; Catalog Code:   ibuprofen ; Order Dt/Tm:   5/24/2017 3:03:55 PM CDT          medroxyPROGESTERone  :   medroxyPROGESTERone ; Status:   Prescribed ; Ordered As Mnemonic:   Provera 10 mg oral tablet ; Simple Display Line:   10 mg, 1 tab(s), po, daily, as directed by provider, 12 tab(s), 4 Refill(s) ; Ordering Provider:   Kirill Nj MD; Catalog Code:   medroxyPROGESTERone ; Order Dt/Tm:    11/17/2018 8:37:11 AM CST          metroNIDAZOLE topical  :   metroNIDAZOLE topical ; Status:   Prescribed ; Ordered As Mnemonic:   metroNIDAZOLE 0.75% topical gel ; Simple Display Line:   1 annie, TOP, daily, 45 g, 3 Refill(s) ; Ordering Provider:   Kirill Nj MD; Catalog Code:   metroNIDAZOLE topical ; Order Dt/Tm:   6/6/2018 1:15:04 PM CDT          Miscellaneous Rx Supply  :   Miscellaneous Rx Supply ; Status:   Prescribed ; Ordered As Mnemonic:   Orthotics ; Simple Display Line:   See Instructions, Use as directed, 1 EA, 0 Refill(s) ; Ordering Provider:   Kirill Nj MD; Catalog Code:   Miscellaneous Rx Supply ; Order Dt/Tm:   5/24/2017 3:07:47 PM CDT

## 2022-02-15 NOTE — NURSING NOTE
Comprehensive Intake Entered On:  11/5/2020 3:24 PM CST    Performed On:  11/5/2020 3:20 PM CST by Mari Cuellar CMA   Chief Complaint :   c/o fatigue, headache sx present since 11/2. Verbal consent given for telephone visit.    Menstrual Status :   Postmenopausal   Height/Length Estimated :   63.5    Race :      Ethnicity :   Not  or    Mari Cuellar CMA - 11/5/2020 3:20 PM CST   Health Status   Allergies Verified? :   Yes   Medication History Verified? :   Yes   Pre-Visit Planning Status :   N/A   Tobacco Use? :   Never smoker   Mari Cuellar CMA - 11/5/2020 3:20 PM CST   Consents   Consent for Immunization Exchange :   Consent Granted   Consent for Immunizations to Providers :   Consent Granted   Mari Cuellar CMA - 11/5/2020 3:20 PM CST   Meds / Allergies   (As Of: 11/5/2020 3:24:27 PM CST)   Allergies (Active)   No Known Medication Allergies  Estimated Onset Date:   Unspecified ; Created By:   Ernesto Sparks CMA; Reaction Status:   Active ; Category:   Drug ; Substance:   No Known Medication Allergies ; Type:   Allergy ; Updated By:   Ernesto Sparks CMA; Reviewed Date:   6/24/2020 10:40 AM CDT        Medication List   (As Of: 11/5/2020 3:24:27 PM CST)   Prescription/Discharge Order    conjugated estrogens  :   conjugated estrogens ; Status:   Prescribed ; Ordered As Mnemonic:   conjugated estrogens 0.3 mg oral tablet ; Simple Display Line:   0.3 mg, 1 tab(s), PO, Daily, 95 tab(s), 3 Refill(s) ; Ordering Provider:   Kirill Nj MD; Catalog Code:   conjugated estrogens ; Order Dt/Tm:   2/4/2020 2:55:33 PM CST          conjugated estrogens topical  :   conjugated estrogens topical ; Status:   Prescribed ; Ordered As Mnemonic:   Premarin 0.625 mg/g vaginal cream with applicator ; Simple Display Line:   1 g, VAG, qPM, uses about about 1-2x a month but might increase if tapers off oral premarin, 42.5 g, 3 Refill(s) ; Ordering Provider:   Kirill Nj MD;  Catalog Code:   conjugated estrogens topical ; Order Dt/Tm:   2/4/2020 2:56:27 PM CST          fluticasone nasal  :   fluticasone nasal ; Status:   Prescribed ; Ordered As Mnemonic:   fluticasone 50 mcg/inh nasal spray ; Simple Display Line:   2 spray(s), Nasal, daily, for 30 day(s), 16 gm, 11 Refill(s) ; Ordering Provider:   Kirill Nj MD; Catalog Code:   fluticasone nasal ; Order Dt/Tm:   2/4/2020 2:57:34 PM CST          ibuprofen  :   ibuprofen ; Status:   Prescribed ; Ordered As Mnemonic:   ibuprofen 600 mg oral tablet ; Simple Display Line:   600 mg, 1 tab(s), po, q8 hrs, PRN: as needed for pain, 45 tab(s), 2 Refill(s) ; Ordering Provider:   Kirill Nj MD; Catalog Code:   ibuprofen ; Order Dt/Tm:   5/24/2017 3:03:55 PM CDT          medroxyPROGESTERone  :   medroxyPROGESTERone ; Status:   Prescribed ; Ordered As Mnemonic:   Provera 10 mg oral tablet ; Simple Display Line:   10 mg, 1 tab(s), po, daily, as directed by provider, 12 tab(s), 4 Refill(s) ; Ordering Provider:   Kirill Nj MD; Catalog Code:   medroxyPROGESTERone ; Order Dt/Tm:   2/4/2020 2:56:02 PM CST          metroNIDAZOLE topical  :   metroNIDAZOLE topical ; Status:   Prescribed ; Ordered As Mnemonic:   metroNIDAZOLE 0.75% topical gel ; Simple Display Line:   1 annie, TOP, daily, 45 g, 3 Refill(s) ; Ordering Provider:   Kirill Nj MD; Catalog Code:   metroNIDAZOLE topical ; Order Dt/Tm:   2/4/2020 2:56:17 PM CST          Miscellaneous Rx Supply  :   Miscellaneous Rx Supply ; Status:   Prescribed ; Ordered As Mnemonic:   Orthotics ; Simple Display Line:   See Instructions, Use as directed, 1 EA, 0 Refill(s) ; Ordering Provider:   Kirill Nj MD; Catalog Code:   Miscellaneous Rx Supply ; Order Dt/Tm:   5/24/2017 3:07:47 PM CDT            ID Risk Screen   Recent Travel History :   No recent travel   Family Member Travel History :   No recent travel   Other Exposure to Infectious Disease :   Unknown   Swanson1 CMA, Mari - 11/5/2020  3:20 PM CST

## 2022-02-15 NOTE — TELEPHONE ENCOUNTER
Entered by Jocelyn Telles CMA on January 02, 2020 4:53:27 PM CST  Reminder letter  just mailed notifying pt that she is due for labs and office visit, ok to refill?      ** Patient matched by Jocelyn Telles CMA on 1/2/2020 4:49:55 PM CST **      ---------------------  From: Marti Mills CMA (eRx Pool ([id number]))   To: [facility] Message Pool ([id number] [facility] [place]);     Sent: 1/2/2020 12:52:39 PM CST  Subject: FW: Medication Management   Due Date/Time: 1/3/2020 11:23:00 AM CST           ------------------------------------------  From: [facility]  To: Kirill Nj MD  Sent: January 2, 2020 11:23:55 AM CST  Subject: Medication Management  Due: January 3, 2020 11:23:55 AM CST    ** On Hold Pending Signature **  Drug: conjugated estrogens (Premarin 0.3 mg oral tablet)  TAKE ONE TABLET BY MOUTH ONCE DAILY  Quantity: 90 unknown unit  Days Supply: 0  Refills: 0  Substitutions Allowed  Notes from Pharmacy:     Dispensed Drug: conjugated estrogens (Premarin 0.3 mg oral tablet)  TAKE ONE TABLET BY MOUTH ONCE DAILY  Quantity: 90 unknown unit  Days Supply: 0  Refills: 0  Substitutions Allowed  Notes from Pharmacy:   ---------------------------------------------------------------  From: Jocelyn Telles CMA ([facility] Message Pool ([id number]_[facility]))   To: Kirill jN MD;     Sent: 1/2/2020 4:53:33 PM CST  Subject: FW: Medication Management   Due Date/Time: 1/3/2020 11:23:00 AM CST---------------------  From: Kirill Nj MD   To: Jocelyn Telles CMA;     Sent: 1/2/2020 4:58:44 PM CST  Subject: RE: Medication Management     Refilled and have her see me for annual physical

## 2022-02-15 NOTE — PROGRESS NOTES
Patient:   LUCHO GOLD            MRN: [id number]            FIN: [id number]               Age:   66 years     Sex:  Female     :  1950   Associated Diagnoses:   Musculoskeletal pain; Pronation of feet   Author:   Kirill Nj MD      Visit Information      Date of Service: 2017 02:39 pm  Performing Location: [facility]  Encounter#: [id number]      Primary Care Provider (PCP):  Kirill Nj MD    NPI# [id number]      Referring Provider:  No referring provider recorded for selected visit.      Chief Complaint   2017 2:41 PM CDT    Follow up on running injury        History of Present Illness   Right buttock area with pain intermittently over the past few years. Now pain in both left and right buttock. Diagnosed with piriformis syndrome in past after seeing Dr Gavi Smith [phone]. Took ibuprofen 2016 which helped. Only hurts after running and not while running.   Pain occurs with stretching. Running does trigger it as well. Did not run for 4 weeks and would notice it while stretching.  Has been to [facility] in the past.  Symptoms not increased with sitting or standing.  Metrogel for rosacea      Review of Systems   Musculoskeletal:  No muscle weakness.    Neurologic:  No numbness, No tingling.    No incontinence      Health Status   Allergies:    Allergic Reactions (Selected)  No Known Medication Allergies   Medications:  (Selected)   Prescriptions  Prescribed  MetroGel 0.75% topical: 1 annie, TOP, daily, # 45 g, 3 Refill(s), Type: Maintenance, Pharmacy: [facility], 1 annie top daily  Premarin 0.625 mg/g vaginal cream with applicator: See Instructions, Instructions: Apply topically 2-3 x's/week & if needed INSERT  TO 2 APPLICATORFUL AT BEDTIME TWICE WEEKLY, # 42 gm, 3 Refill(s), Pharmacy: [facility], Due for Physical, Apply topically 2-3 x's/week & if needed INSERT  TO ...  Provera 10 mg oral tablet: 1 tab(s) ( 10 mg ), po, daily, Instructions: as  directed by provider, # 12 tab(s), 4 Refill(s), Type: Maintenance, Pharmacy: [facility], 1 tab(s) po daily,Instr:as directed by provider  conjugated estrogens 0.3 mg oral tablet: 1 tab(s) ( 0.3 mg ), PO, Daily, # 90 tab(s), 3 Refill(s), Type: Maintenance, Pharmacy: [facility], Appoinement scheduled for 9/27, 1 tab(s) po daily  fluticasone 50 mcg/inh nasal spray: 2 spray(s), nasal, daily, # 2 EA, 5 Refill(s), Type: Maintenance, Pharmacy: [facility], will need an appt for further refills., 2 spray(s) nasal daily  ibuprofen 800 mg oral tablet: 1 tab(s) ( 800 mg ), po, tid, # 42 tab(s), 1 Refill(s), Type: Maintenance, Pharmacy: [facility], 1 tab(s) po tid,x14 day(s)   Problem list:    All Problems  History of rosacea / SNOMED CT 3846550018 / Confirmed  Tobacco Abuse, in Remission / ICD-9-.1 / Confirmed  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  Comments:  6/6/2013 4:51 PM - Marleen Sanz  Low risk for cervical cancer. Repeat 3 yrs (age 65), then if neg may stop pap screening.  Bone density scan (SNOMED CT 363717224) on 4/20/2010 at 59 Years.  Comments:  10/15/2010 9:54 AM - Dayan Guzmán - Stable  DEXA - Dual energy X-ray photon absorptiometry (SNOMED CT 177963325) on 4/14/2010 at 59 Years.  Comments:  2/24/2011 10:33 AM - Marleen Sanz. Repeat 5 yrs. (Due 2015)  HPV - Human papillomavirus test negative (SNOMED CT 9610517807) on 2/18/2010 at 59 Years.  Comments:  2/24/2011 10:34 AM - Marleen Sanz  Low risk for cervical cancer. OK to have pap q 3 yrs.  Colonoscopy (SNOMED CT 457129250) performed by Cristino Knight MD on 3/6/2007 at 56  Years.  Comments:  2015 3:33 PM - Kymberly IRENE, Rachael  Pt received IV sedation.    2011 10:31 AM - Jaison KOLB, Marleen  Normal. Repeat 10 yrs if no new risk factors. (Due 2017)  Breast biopsy sample (SNOMED CT 9527478203) in the month of 1998 at 47 Years.   section (SNOMED CT 39800174) in  at 36 Years.   Social History:       Physical Examination   Vital Signs   2017 2:41 PM CDT Temperature Tympanic 96.9 DegF  LOW    Peripheral Pulse Rate 60 bpm    Systolic Blood Pressure 100 mmHg    Diastolic Blood Pressure 60 mmHg      General:  Alert and oriented, No acute distress.    Musculoskeletal:  Normal range of motion, Normal strength, Normal gait, tender inferior pubic rami.    Integumentary:  no bruising or swelling.    Psychiatric:  Appropriate mood & affect.       Impression and Plan   Diagnosis     Musculoskeletal pain (VRO15-SQ M79.1).     Pronation of feet (XMU43-LN M21.6X9).     Arrange MRI rule out radiculopathy. Take ibuprofen as needed  Orthotic px written  Refilled metrogel and flonase

## 2022-02-15 NOTE — NURSING NOTE
Comprehensive Intake Entered On:  8/23/2021 2:07 PM CDT    Performed On:  8/23/2021 2:02 PM CDT by Jocelyn Telles CMA               Summary   Chief Complaint :   c/o right arm pain denies any injury   Menstrual Status :   Postmenopausal   Weight Measured :   120.1 lb(Converted to: 120 lb 2 oz, 54.476 kg)    Height Measured :   63.5 in(Converted to: 5 ft 3 in, 161.29 cm)    Body Mass Index :   20.94 kg/m2   Body Surface Area :   1.56 m2   Height/Length Estimated :   63.5    Systolic Blood Pressure :   108 mmHg   Diastolic Blood Pressure :   66 mmHg   Mean Arterial Pressure :   80 mmHg   Peripheral Pulse Rate :   64 bpm   BP Site :   Right arm   Pulse Site :   Radial artery   BP Method :   Manual   HR Method :   Manual   Temperature Tympanic :   97.1 DegF(Converted to: 36.2 DegC)  (LOW)    Race :      Ethnicity :   Not  or    Jocelyn Telles CMA - 8/23/2021 2:02 PM CDT   Health Status   Allergies Verified? :   Yes   Medication History Verified? :   Yes   Medical History Verified? :   No   Pre-Visit Planning Status :   Completed   Tobacco Use? :   Former smoker   Jocelyn Telles CMA - 8/23/2021 2:02 PM CDT   Consents   Consent for Immunization Exchange :   Consent Granted   Consent for Immunizations to Providers :   Consent Granted   Jocelyn Telles CMA - 8/23/2021 2:02 PM CDT   Meds / Allergies   (As Of: 8/23/2021 2:07:15 PM CDT)   Allergies (Active)   No Known Medication Allergies  Estimated Onset Date:   Unspecified ; Created By:   Ernesto Sparks CMA; Reaction Status:   Active ; Category:   Drug ; Substance:   No Known Medication Allergies ; Type:   Allergy ; Updated By:   Ernesto Sparks CMA; Reviewed Date:   8/23/2021 2:03 PM CDT        Medication List   (As Of: 8/23/2021 2:07:15 PM CDT)   Prescription/Discharge Order    conjugated estrogens topical  :   conjugated estrogens topical ; Status:   Prescribed ; Ordered As Mnemonic:   Premarin 0.625 mg/g vaginal cream with applicator ; Simple  Display Line:   1 g, VAG, qPM, uses about about 1-2x a month but might increase if tapers off oral premarin, 42.5 g, 3 Refill(s) ; Ordering Provider:   Kirill Nj MD; Catalog Code:   conjugated estrogens topical ; Order Dt/Tm:   2/4/2020 2:56:27 PM CST          fluticasone nasal  :   fluticasone nasal ; Status:   Prescribed ; Ordered As Mnemonic:   fluticasone 50 mcg/inh nasal spray ; Simple Display Line:   2 spray(s), Nasal, daily, for 30 day(s), 16 gm, 11 Refill(s) ; Ordering Provider:   Kirill Nj MD; Catalog Code:   fluticasone nasal ; Order Dt/Tm:   2/4/2020 2:57:34 PM CST          ibuprofen  :   ibuprofen ; Status:   Prescribed ; Ordered As Mnemonic:   ibuprofen 600 mg oral tablet ; Simple Display Line:   600 mg, 1 tab(s), po, q8 hrs, PRN: as needed for pain, 45 tab(s), 2 Refill(s) ; Ordering Provider:   Kirill Nj MD; Catalog Code:   ibuprofen ; Order Dt/Tm:   5/24/2017 3:03:55 PM CDT          metroNIDAZOLE topical  :   metroNIDAZOLE topical ; Status:   Prescribed ; Ordered As Mnemonic:   metroNIDAZOLE 0.75% topical gel ; Simple Display Line:   1 annie, TOP, daily, 45 g, 3 Refill(s) ; Ordering Provider:   Kirill Nj MD; Catalog Code:   metroNIDAZOLE topical ; Order Dt/Tm:   2/4/2020 2:56:17 PM CST          Miscellaneous Rx Supply  :   Miscellaneous Rx Supply ; Status:   Prescribed ; Ordered As Mnemonic:   Orthotics ; Simple Display Line:   See Instructions, Use as directed, 1 EA, 0 Refill(s) ; Ordering Provider:   Kirill Nj MD; Catalog Code:   Miscellaneous Rx Supply ; Order Dt/Tm:   5/24/2017 3:07:47 PM CDT            Social History   Social History   (As Of: 8/23/2021 2:07:15 PM CDT)   Alcohol:  Current      Current, Wine (5 oz), 6 times per week, 1 drinks/episode average.  2 drinks/episode maximum.   (Last Updated: 10/28/2016 3:57:58 PM CDT by Carol Mendes)          Tobacco:  Past      Former smoker, quit more than 30 days ago   (Last Updated: 2/4/2021 8:22:05 AM CST by Elfego  Jocelyn MONTEMAYOR)          Electronic Cigarette/Vaping:        Electronic Cigarette Use: Never.   (Last Updated: 2/4/2021 8:22:11 AM CST by Jocelyn Telles CMA)          Substance Abuse:  Denies Substance Abuse      Never   (Last Updated: 8/26/2015 11:41:41 AM CDT by Mercedez Herman RN)          Employment/School:        Retired   (Last Updated: 8/26/2015 11:41:33 AM CDT by Mercedez Herman RN)          Home/Environment:        Spouse/Partner name: Gilberto Partida.  1 children.   (Last Updated: 8/26/2015 11:41:26 AM CDT by Mercedez Herman RN)          Nutrition/Health:         Comments:  8/26/2015 11:40 AM - Mercedez Herman RN: Mostly gluten Free   (Last Updated: 8/26/2015 11:40:48 AM CDT by Mercedez Herman RN)          Exercise:  Regular exercise      Exercise frequency: 5-6 times/week.  Exercise type: Bicycling, Running, Swimming, Weight lifting, Yoga.   (Last Updated: 8/27/2015 4:18:40 PM CDT by Carol Mendes)          Sexual:        Sexually active: Yes.   (Last Updated: 2/24/2011 10:27:36 AM CST by Marti Hooper CMA)

## 2022-02-15 NOTE — LETTER
(Inserted Image. Unable to display)     July 01, 2019      LUCHO FOSTER   [address] [place], [place] [id number]          Dear LUCHO,      Thank you for selecting [facility] (previously [facility]) for your healthcare needs.      Our records indicate you are due for the following services:     Follow-up office visit / Medication Check.      To schedule an appointment or if you have further questions, please contact your primary clinic:   [facility]       [phone]   [facility]       [phone]              [facility]     [phone]      Powered by [employer]    Sincerely,    Kirill Nj MD

## 2022-02-15 NOTE — TELEPHONE ENCOUNTER
---------------------  From: Lucie Mckeon CMA   Sent: 11/5/2020 3:48:56 PM CST  Subject: [facility] Testing     Pt here for covid testing at [facility] per Geoffrey Garcia. 02 Sat=96%. Specimen sent to [email or web address] lab. Faxed form to [facility].

## 2022-02-15 NOTE — PROGRESS NOTES
Patient:   LUCHO GOLD            MRN: [id number]            FIN: [id number]               Age:   66 years     Sex:  Female     :  1950   Associated Diagnoses:   Musculoskeletal pain; Pronation of feet   Author:   Kirill Nj MD      Visit Information      Date of Service: 2017 02:35 pm  [facility]  Encounter#: [id number]      Primary Care Provider (PCP):  Kirill Nj MD    NPI# [id number]      Referring Provider:  Kirill Nj MD    NPI# [id number]      Chief Complaint   2017 2:48 PM CDT     follow up muscle pain. Review MRI      History of Present Illness   Right buttock area with pain intermittently over the past few years. Now pain in both left and right buttock. Diagnosed with piriformis syndrome in past after seeing Dr Gavi Smith [phone]. Took ibuprofen 2016 which helped. Only hurts after running and but not during running. The pain can last for several days to weeks. Aggravated by long and hard runs.  Did not run for 4 weeks and would notice it while stretching.  Has been to [facility] in the past.  Symptoms not increased with sitting or standing.  Metrogel for rosacea  Has worked on biomechanics.  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    Neurologic:  No numbness, No tingling.    No incontinence      Health Status   Allergies:    Allergic Reactions (Selected)  No Known Medication Allergies   Medications:  (Selected)   Prescriptions  Prescribed  MetroGel 0.75% topical: 1 annie, TOP, daily, # 45 g, 3 Refill(s), Type: Maintenance, Pharmacy: [facility], 1 annie top daily  Orthotics: Orthotics, See Instructions, Instructions: Use as directed, Supply, # 1 EA, 0 Refill(s), Type: Maintenance  Premarin 0.625 mg/g vaginal cream with applicator: See Instructions, Instructions: Apply topically 2-3 x's/week & if needed INSERT 1/4 TO 1/2 APPLICATORFUL AT BEDTIME TWICE WEEKLY, # 42 gm, 3  Refill(s), Pharmacy: [facility], Due for Physical, Apply topically 2-3 x's/week & if needed INSERT 1/4 TO 1/...  Provera 10 mg oral tablet: 1 tab(s) ( 10 mg ), po, daily, Instructions: as directed by provider, # 12 tab(s), 4 Refill(s), Type: Maintenance, Pharmacy: [facility], 1 tab(s) po daily,Instr:as directed by provider  conjugated estrogens 0.3 mg oral tablet: 1 tab(s) ( 0.3 mg ), PO, Daily, # 90 tab(s), 3 Refill(s), Type: Maintenance, Pharmacy: [facility], Appoinement scheduled for 9/27, 1 tab(s) po daily  fluticasone 50 mcg/inh nasal spray: 2 spray(s), nasal, daily, # 2 EA, 5 Refill(s), Type: Maintenance, Pharmacy: [facility], will need an appt for further refills., 2 spray(s) nasal daily  ibuprofen 600 mg oral tablet: 1 tab(s) ( 600 mg ), po, q8 hrs, PRN: as needed for pain, # 45 tab(s), 2 Refill(s), Type: Maintenance, Pharmacy: [facility], 1 tab(s) po q8 hrs,PRN:as needed for pain   Problem list:    All Problems  History of rosacea / SNOMED CT 3999140507 / Confirmed  Tobacco Abuse, in Remission / ICD-9-.1 / Confirmed  Urethral syndrome / SNOMED CT 99164591 / Confirmed  Inactive: ASCUS on PAP Smear / ICD-9-.01  Resolved: History of chicken pox / SNOMED CT 664347599  Canceled: Family history of diabetes mellitus type II / ICD-9-CM V18.0      Histories   Social History:       Physical Examination   VS/Measurements   General:  Alert and oriented, No acute distress.    Musculoskeletal:  Normal range of motion, Normal strength, Normal gait, tender inferior pubic rami.    Psychiatric:  Appropriate mood & affect.       Impression and Plan   Diagnosis     Musculoskeletal pain (KYR59-BL M79.1).     Discussed MRI and likely tendinopathy. Work on controlled heavy load exercise program. Follow up with Orthopedics if not improved  Orthotic px written  Refilled metrogel and flonase

## 2022-02-15 NOTE — PROGRESS NOTES
Patient:   LUCHO GOLD            MRN: [id number]            FIN: [id number]               Age:   69 years     Sex:  Female     :  1950   Associated Diagnoses:   Sore throat   Author:   Benjy Franklin MD      Chief Complaint   2020 8:51 AM CST    c/o  Sore throat with chills x 1 week        History of Present Illness             The patient presents with a sore throat.  The sore throat is described as burning.  The severity of the sore throat is mild.  The timing/course of the sore throat is constant.  The sore throat has lasted for 3 day(s).  she has work trip in [facility] so wanted to be checked out.        Review of Systems   Constitutional:  No fever, No chills.    Respiratory:  No shortness of breath.    Gastrointestinal:  No nausea, No vomiting.    Integumentary:  No rash.       Health Status   Allergies:    Allergic Reactions (Selected)  No Known Medication Allergies   Medications:  (Selected)   Prescriptions  Prescribed  Orthotics: Orthotics, See Instructions, Instructions: Use as directed, Supply, # 1 EA, 0 Refill(s), Type: Maintenance  Premarin 0.625 mg/g vaginal cream with applicator: ( 1 g ), VAG, qPM, Instructions: uses about 2x a week, # 42.5 g, 3 Refill(s), Type: Maintenance, Pharmacy: [facility], 1 gm vag qpm,Instr:uses about 2x a week  Provera 10 mg oral tablet: = 1 tab(s) ( 10 mg ), po, daily, Instructions: as directed by provider, # 12 tab(s), 4 Refill(s), Type: Maintenance, Pharmacy: [facility], 1 tab(s) Oral daily,Instr:as directed by provider  conjugated estrogens 0.3 mg oral tablet: = 1 tab(s) ( 0.3 mg ), PO, Daily, # 90 tab(s), 0 Refill(s), Type: Maintenance, Pharmacy: [facility], 1 tab(s) Oral daily  estradiol-levonorgestrel 0.045 mg-0.015 mg/24 hr transdermal film, extended release: 1 patch(es), top, qweek, Instructions: fill if decides to change from oral, # 12 patch(es), 3 Refill(s), Type: Maintenance, Pharmacy: [facility], 1 patch(es) top qweek,Instr:fill if  "decides to change from oral  fluticasone 50 mcg/inh nasal spray: = 2 spray(s), Nasal, daily, # 16 gm, 0 Refill(s), Type: Maintenance, Pharmacy: [facility], due for visit for further refills  ibuprofen 600 mg oral tablet: 1 tab(s) ( 600 mg ), po, q8 hrs, PRN: as needed for pain, # 45 tab(s), 2 Refill(s), Type: Maintenance, Pharmacy: [facility], 1 tab(s) po q8 hrs,PRN:as needed for pain  metroNIDAZOLE 0.75% topical gel: 1 annie, TOP, daily, # 45 g, 3 Refill(s), Type: Maintenance, Pharmacy: [facility], 1 annie top daily   Problem list:    All Problems (Selected)  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   Family History:    Dementia  Mother (\"Jinx\", )  Diabetes mellitus  Father ()  Breast cancer  Mother (\"Jinx\", ): onset at 87 .  Stroke  Mother (\"Jinx\", ): onset at 74 .  Comments:  2014 2:53 PM HILDAT - Jaison NP-CMarleen  On Coumadin following CVA.  AIDS - Acquired immunodeficiency syndrome  Brother  CVA - Cerebrovascular accident  Brother: onset at 58 .  Comments:  2013 3:27 PM HILDAT - Jaison NP-CMarleen  Non-smoker. Unknown cause. Good recovery.  Sarcoidosis  Father ()  Alzheimer's disease  Grandmother (P)     Procedure history:    HPV - Human papillomavirus test negative (SNOMED CT 4810515058) on 2013 at 62 Years.  Comments:  2013 4:51 PM HILDAT - Jaison NP-CMarleen  Low risk for cervical cancer. Repeat 3 yrs (age 65), then if neg may stop pap screening.  Bone density scan (SNOMED CT 170569072) on 2010 at 59 Years.  Comments:  10/15/2010 9:54 AM CDT - Dayan Guzmán  Normal - Stable  DEXA - Dual energy X-ray photon absorptiometry ([facility] CT " 195397015) on 2010 at 59 Years.  Comments:  2011 10:33 AM LES PRINGLEC, Marleen  Normal. Repeat 5 yrs. (Due )  HPV - Human papillomavirus test negative (SNOMED CT 6356889405) on 2010 at 59 Years.  Comments:  2011 10:34 AM Marleen Emerson  Low risk for cervical cancer. OK to have pap q 3 yrs.  Colonoscopy (SNOMED CT 255543214) performed by Cristino Knight MD on 3/6/2007 at 56 Years.  Comments:  2015 3:33 PM CDT - Kymberly IRENE, Rachael  Pt received IV sedation.    2011 10:31 AM Marleen Emerson  Normal. Repeat 10 yrs if no new risk factors. (Due )  Breast biopsy sample (SNOMED CT 2580825017) in the month of 1998 at 47 Years.   section (SNOMED CT 18301296) in  at 36 Years.   Social History:        Alcohol Assessment: Current            Current, Wine (5 oz), 6 times per week, 1 drinks/episode average.  2 drinks/episode maximum.      Tobacco Assessment: Denies Tobacco Use            Past                     Comments:                      2015 - Carol Mendes                     smoked for 10 years 8606-6302      Substance Abuse Assessment: Denies Substance Abuse            Never      Employment and Education Assessment            Retired      Home and Environment Assessment            Spouse/Partner name: Gilberto Partida.  1 children.      Nutrition and Health Assessment                     Comments:                      2015 - Batsheva PRESSLEY, Mercedez                     Mostly gluten Free      Exercise and Physical Activity Assessment: Regular exercise            Exercise frequency: 5-6 times/week.  Exercise type: Bicycling, Running, Swimming, Weight lifting, Yoga.      Sexual Assessment            Sexually active: Yes.        Physical Examination   Vital Signs   2020 8:51 AM CST Temperature Tympanic 97.8 DegF  LOW    Peripheral Pulse Rate 62 bpm    Systolic Blood Pressure 122 mmHg    Diastolic Blood Pressure 60  mmHg    Mean Arterial Pressure 81 mmHg      Measurements from flowsheet : Measurements   1/20/2020 8:51 AM CST Height Measured - Standard 63.5 in    Weight Measured - Standard 127.8 lb    BSA 1.61 m2    Body Mass Index 22.28 kg/m2      General:  Alert and oriented, No acute distress.    Neck:  Supple.    Respiratory:  Lungs are clear to auscultation, Respirations are non-labored.    Cardiovascular:  Regular rhythm.    Integumentary:  Warm.    Psychiatric:  Cooperative, Appropriate mood & affect.       Review / Management   Results review:  Lab results: 1/20/2020 9:20 AM CST    Group A Strep POC         NOT DETECTED  .       Impression and Plan   Diagnosis     Sore throat (WVE24-JS J02.9).     Plan:  gargling may help  ensure plenty of fluids  full culture will be done  tylenol for pain   recheck if worse.

## 2022-02-15 NOTE — PROGRESS NOTES
Chief Complaint    c/o lump on bottom of left foot--noticed x5wks ago.  History of Present Illness      Patient is here with a painful area on her left foot.  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  She did not have any recent changes in her foot wear.  She has more discomfort when walking barefoot.  There is a small area on the mid metatarsal heads that is painful.  Denies any other injury.      She has noticed some thickening in the left palm has some questions about this.  It is nonpainful.  Review of Systems      See HPI.  All other review of systems negative.  Physical Exam   Vitals & Measurements    T: 97.2(Tympanic)  HR: 64(Peripheral)  BP: 136/62     HT: 63.5 in  WT: 123.4 lb  BMI: 21.51       Alert, oriented, no acute distress      Normal heart rate      Nonlabored breathing      Thickening of the pulmonary fascia on the left      Small choroid present over the middle of the metatarsal heads on the left foot      This was pared with #10 blade with improvement in discomfort  Assessment/Plan       Corn of foot        treatment as above, return to regular use of orthotics, follow up if not improving       Dupuytren's fibromatosis        observe, discussed natural history of problem  Patient Information     Name:LUCHO GOLD      Address:      [address] [address]     Sex:Female     YOB: 1950     [phone]     MRN:[id number]     FIN:[id number]     Location:[facility]     Date of Service:10/18/2017      Primary Care Physician:       Kirill Nj MD, [phone]  Problem List/Past Medical History    Ongoing     ASCUS on PAP Smear           History of rosacea           Tobacco Abuse, in Remission           Urethral syndrome          Historical     History of chicken pox        Procedure/Surgical History     HPV - Human papillomavirus test negative  (2013)     Bone density scan (2010)     DEXA - Dual energy X-ray photon absorptiometry (2010)     HPV - Human papillomavirus test negative (2010)     Colonoscopy (2007)     Breast biopsy sample (1998)      section ()  Medications    conjugated estrogens 0.3 mg oral tablet, 0.3 mg= 1 tab(s), po, daily    fluticasone 50 mcg/inh nasal spray, 2 spray(s), nasal, daily, 5 refills    ibuprofen 600 mg oral tablet, 600 mg= 1 tab(s), po, q8 hrs, PRN, 2 refills    MetroGel 0.75% topical, 1 annie, top, daily, 3 refills    Orthotics, See Instructions    Premarin 0.625 mg/g vaginal cream with applicator, See Instructions, 3 refills    Provera 10 mg oral tablet, 10 mg= 1 tab(s), po, daily, 4 refills  Allergies    No Known Medication Allergies  Social History    Smoking Status - 10/18/2017     Former smoker     Alcohol - Current, 10/28/2016      Current, Wine (5 oz), 6 times per week, 1 drinks/episode average. 2 drinks/episode maximum.     Employment and Education - 10/28/2016      Retired     Exercise and Physical Activity - Regular exercise, 10/28/2016      Exercise frequency: 5-6 times/week. Exercise type: Bicycling, Running, Swimming, Weight lifting, Yoga.     Home and Environment - 10/28/2016      Spouse/Partner name: Gilberto Partida. 1 children.     Nutrition and Health - 10/28/2016     Sexual - 10/28/2016      Sexually active: Yes.     Substance Abuse - Denies Substance Abuse, 10/28/2016      Never     Tobacco - Denies Tobacco Use, 10/28/2016      Past  Family History    AIDS - Acquired immunodeficiency syndrome: Brother.    Alzheimer's disease: Grandmother (P).    Breast cancer: Mother (Dx at 87).    CVA - Cerebrovascular accident: Brother (Dx at 58).    Dementia: Mother.    Diabetes mellitus: Father.    Sarcoidosis: Father.    Stroke: Mother (Dx at 74).  Immunizations      Vaccine Date Status      pneumococcal (PCV13) 2016 Given      influenza virus vaccine, inactivated  10/11/2016 Given      pneumococcal (PPSV23) 10/27/2015 Given      influenza virus vaccine, inactivated 10/27/2015 Given      influenza virus vaccine, inactivated 01/05/2015 Given      influenza virus vaccine, inactivated 11/22/2013 Given      influenza virus vaccine, inactivated 11/20/2012 Given      influenza virus vaccine, inactivated 10/20/2011 Given      influenza virus vaccine, inactivated 10/20/2011 Given      influenza 11/29/2010 Given      influenza, H1N1, inactivated 02/04/2010 Recorded      hepatitis A adult vaccine 10/02/2007 Recorded      Hep B 06/05/2007 Recorded      Hep B 09/22/2006 Recorded      IPV 08/30/2006 Recorded      typhoid, inactivated 08/30/2006 Recorded      Hep B 08/25/2006 Recorded      hepatitis A adult vaccine 08/25/2006 Recorded      tetanus/diphth/pertuss (Tdap) adult/adol 08/01/2006 Recorded  Lab Results   Results (Last 90 days)   No results located.

## 2022-02-15 NOTE — PROGRESS NOTES
Patient:   LUCHO GOLD            MRN: [id number]            FIN: [id number]               Age:   67 years     Sex:  Female     :  1950   Associated Diagnoses:   Acute vaginitis   Author:   Preston Christine MD      Visit Information      Date of Service: 2017 01:54 pm  Performing Location: [facility]  Encounter#: [id number]      Primary Care Provider (PCP):  Kirill Nj MD, NPI# [id number]      Referring Provider:  Kirill Nj MD, NPI# [id number]      Chief Complaint   2017 1:57 PM CDT    Consult per  GJM  to discuss vaginal irritation, irritation.  ? rolapse urethra.      Interval History   The patient is referred by Dr. Nj for evaluation of vaginal irritation.  She has had some vaginal irritation for the past two weeks which has improved with antibiotics.  Examination had suggested urethral caruncle and UA was negative.        Review of Systems   Review  of systems is negative except as documented under interval history.      Health Status   Allergies:    Allergic Reactions (Selected)  No Known Medication Allergies   Medications:  (Selected)   Prescriptions  Prescribed  MetroGel 0.75% topical: 1 annie, TOP, daily, # 45 g, 3 Refill(s), Type: Maintenance, Pharmacy: [facility], 1 annie top daily  Orthotics: Orthotics, See Instructions, Instructions: Use as directed, Supply, # 1 EA, 0 Refill(s), Type: Maintenance  Premarin 0.625 mg/g vaginal cream with applicator: ( 1 g ), VAG, qPM, Instructions: start daily for 2 weeks and then 2x a week, # 42.5 g, 3 Refill(s), Type: Maintenance, Pharmacy: [facility], 1 gm vag qpm,Instr:start daily for 2 weeks and then 2x a week  Premarin 0.625 mg/g vaginal cream with applicator: See Instructions, Instructions: Apply topically 2-3 x's/week & if needed INSERT  TO  APPLICATORFUL AT BEDTIME TWICE WEEKLY, # 42 gm, 3 Refill(s), Pharmacy: [facility], Due for Physical, Apply topically 2-3 x's/week & if needed INSERT  TO  "1/...  Provera 10 mg oral tablet: 1 tab(s) ( 10 mg ), po, daily, Instructions: as directed by provider, # 12 tab(s), 4 Refill(s), Type: Maintenance, Pharmacy: [facility], 1 tab(s) po daily,Instr:as directed by provider  conjugated estrogens 0.3 mg oral tablet: 1 tab(s) ( 0.3 mg ), PO, Daily, # 90 tab(s), 3 Refill(s), Type: Maintenance, Pharmacy: [facility], Appoinement scheduled for , 1 tab(s) po daily  fluticasone 50 mcg/inh nasal spray: 2 spray(s), nasal, daily, # 2 EA, 5 Refill(s), Type: Maintenance, Pharmacy: [facility], will need an appt for further refills., 2 spray(s) nasal daily  ibuprofen 600 mg oral tablet: 1 tab(s) ( 600 mg ), po, q8 hrs, PRN: as needed for pain, # 45 tab(s), 2 Refill(s), Type: Maintenance, Pharmacy: [facility], 1 tab(s) po q8 hrs,PRN:as needed for pain   Problem list:    All Problems  Urethral syndrome / SNOMED CT 11990112 / Confirmed  Tobacco Abuse, in Remission / ICD-9-.1 / Confirmed  History of rosacea / SNOMED CT 2860670113 / Confirmed  Inactive: ASCUS on PAP Smear / ICD-9-.01  Resolved: History of chicken pox / SNOMED CT 917773651  Canceled: Family history of diabetes mellitus type II / ICD-9-CM V18.0      Histories   Past Medical History:    Active  Urethral syndrome (30385564)  Tobacco Abuse, in Remission (305.1)  History of rosacea (3804884835)  Resolved  History of chicken pox (391424455):  Resolved.   Family History:    Dementia  Mother (\"Jinx\", )  Diabetes mellitus  Father ()  Breast cancer  Mother (\"Jinx\", ): onset at 87 .  Stroke  Mother (\"Jinx\", ): onset at 74 .  Comments:  2014 2:53 PM - Jaison KOLB, Marleen  On Coumadin following CVA.  AIDS - Acquired immunodeficiency syndrome  Brother  CVA - Cerebrovascular accident  Brother: onset at 58 .  Comments:  2013 3:27 PM - Marleen Sanz  Non-smoker. Unknown cause. Good recovery.  Sarcoidosis  Father ()  Alzheimer's disease  Grandmother (P)   "   Procedure history:    HPV - Human papillomavirus test negative (8304898444) on 2013 at 62 Years.  Comments:  2013 4:51 PM - Marleen Sanz  Low risk for cervical cancer. Repeat 3 yrs (age 65), then if neg may stop pap screening.  Bone density scan (057239300) on 2010 at 59 Years.  Comments:  10/15/2010 9:54 AM - Dayan Guzmán - Stable  DEXA - Dual energy X-ray photon absorptiometry (183849202) on 2010 at 59 Years.  Comments:  2011 10:33 AM - Marleen Sanz  Normal. Repeat 5 yrs. (Due )  HPV - Human papillomavirus test negative (1784398062) on 2010 at 59 Years.  Comments:  2011 10:34 AM - Marleen Sanz  Low risk for cervical cancer. OK to have pap q 3 yrs.  Colonoscopy (000033119) on 3/6/2007 at 56 Years.  Comments:  2015 3:33 PM - Kymberly IRENE, Rachael  Pt received IV sedation.    2011 10:31 AM - Marleen Sanz. Repeat 10 yrs if no new risk factors. (Due )  Breast biopsy sample (3705850766) in the month of 1998 at 47 Years.   section (18382376) in  at 36 Years.   Social History:        Alcohol Assessment: Current            Current, Wine (5 oz), 6 times per week, 1 drinks/episode average.  2 drinks/episode maximum.      Tobacco Assessment: Denies Tobacco Use            Past                     Comments:                      2015 - Carol Mendes                     smoked for 10 years 3324-6629      Substance Abuse Assessment: Denies Substance Abuse            Never      Employment and Education Assessment            Retired      Home and Environment Assessment            Spouse/Partner name: Gilberto Partida.  1 children.      Nutrition and Health Assessment                     Comments:                      2015 - Batsheva PRESSLEY, Mercedez                     Mostly gluten Free      Exercise and Physical Activity Assessment: Regular exercise            Exercise frequency: 5-6  times/week.  Exercise type: Bicycling, Running, Swimming, Weight lifting, Yoga.      Sexual Assessment            Sexually active: Yes.        Physical Examination   Vital Signs   11/1/2017 1:57 PM CDT Peripheral Pulse Rate 51 bpm  LOW    HR Method Electronic    Systolic Blood Pressure 111 mmHg    Diastolic Blood Pressure 68 mmHg    Mean Arterial Pressure 82 mmHg    BP Site Right arm    BP Method Electronic      Gynecologic:  The patient reports there was a more swollen area where there is now a regressing area of folliculitis on the right side of the vulva near the lateral portion of the vulva above her thigh.  The vulva is otherwise normal.  Vagina is fairly normal in appearance other than a tiny area of urethral prolapse within the meatus.  There are no areas of tenderness..       Impression and Plan   Diagnosis     Acute vaginitis (ICY68-OM N76.0).     Minor urethral prolapse, probably not symptomatic.    Pain in vulva probably was a generalized discomfort prior to this folliculitis draining and healing on recent antibiotic therapy..     Plan:  The patient was advised that she should continue with her Premarin and cyclic progestin, which will be refilled.  She has some Premarin cream at home which she can use sparingly in the area of the urethra to give some added estrogen effect to that area.  She seemed comfortable with today's discussion..    Patient Instructions:       Counseled: Patient, Regarding diagnosis, Regarding treatment, Regarding medications, Verbalized understanding.

## 2022-02-15 NOTE — PROGRESS NOTES
Patient:   LUCHO GOLD            MRN: [id number]            FIN: [id number]               Age:   67 years     Sex:  Female     :  1950   Associated Diagnoses:   Musculoskeletal pain   Author:   Kirill Nj MD      Visit Information      Date of Service: 2018 04:40 pm  Performing Location: [facility]  Encounter#: [id number]      Primary Care Provider (PCP):  Kirill Nj MD    NPI# [id number]      Referring Provider:  Kirill Nj MD, NPI# [id number]      Chief Complaint   2018 4:41 PM CDT     discuss ongoing running injury      History of Present Illness   Right buttock area with pain started 2017 with two months of biking and skiing. Had been doing strengthening exercises past month without some improvement. Had left piriformis syndrome a couple years ago which improved with physical therapy.  Pain occurs with stretching. Running does trigger it as well. Able to run slowly. Feels it when bends forward.  Symptoms resolved last summer. Symptoms recurred two months with running even though had been running all year.      Review of Systems   Musculoskeletal:  No muscle weakness.    Neurologic:  No numbness, No tingling.    No incontinence      Health Status   Allergies:    Allergic Reactions (Selected)  No Known Medication Allergies   Medications:  (Selected)   Prescriptions  Prescribed  Orthotics: Orthotics, See Instructions, Instructions: Use as directed, Supply, # 1 EA, 0 Refill(s), Type: Maintenance  Premarin 0.625 mg/g vaginal cream with applicator: ( 1 g ), VAG, qPM, Instructions: uses about 2x a week, # 42.5 g, 3 Refill(s), Type: Maintenance, Pharmacy: [facility], 1 gm vag qpm,Instr:uses about 2x a week  Provera 10 mg oral tablet: 1 tab(s) ( 10 mg ), po, daily, Instructions: as directed by provider, # 12 tab(s), 4 Refill(s), Type: Maintenance, Pharmacy: [facility], 1 tab(s) po daily,Instr:as directed by provider  conjugated estrogens 0.3 mg oral tablet: 1  tab(s) ( 0.3 mg ), PO, Daily, # 90 tab(s), 3 Refill(s), Type: Maintenance, Pharmacy: [facility], Appoinement scheduled for 9/27, 1 tab(s) po daily  estradiol-levonorgestrel 0.045 mg-0.015 mg/24 hr transdermal film, extended release: 1 patch(es), top, qweek, Instructions: fill if decides to change from oral, # 12 patch(es), 3 Refill(s), Type: Maintenance, Pharmacy: [facility], 1 patch(es) top qweek,Instr:fill if decides to change from oral  fluticasone 50 mcg/inh nasal spray: See Instructions, Instructions: INHALE 2 SPRAYS IN EACH NOSTRIL ONCE DAILY, # 16 gm, 11 Refill(s), Type: Maintenance, Pharmacy: [facility], INHALE 2 SPRAYS IN EACH NOSTRIL ONCE DAILY  ibuprofen 600 mg oral tablet: 1 tab(s) ( 600 mg ), po, q8 hrs, PRN: as needed for pain, # 45 tab(s), 2 Refill(s), Type: Maintenance, Pharmacy: [facility], 1 tab(s) po q8 hrs,PRN:as needed for pain  metroNIDAZOLE 0.75% topical gel: 1 annie, TOP, daily, # 45 g, 3 Refill(s), Type: Maintenance, Pharmacy: [facility], 1 annie top daily   Problem list:    All Problems  History of rosacea / SNOMED CT 6618400632 / Confirmed  Tobacco Abuse, in Remission / ICD-9-.1 / Confirmed  Urethral syndrome / SNOMED CT 17956172 / Confirmed  Inactive: ASCUS on PAP Smear / ICD-9-.01  Resolved: History of chicken pox / SNOMED CT 556105118  Canceled: Family history of diabetes mellitus type II / ICD-9-CM V18.0      Histories   Procedure history:    HPV - Human papillomavirus test negative (SNOMED CT 9651264051) on 5/30/2013 at 62 Years.  Comments:  6/6/2013 4:51 PM - Marleen Sanz  Low risk for cervical cancer. Repeat 3 yrs (age 65), then if neg may stop pap screening.  Bone density scan (SNOMED CT 674468155) on 4/20/2010 at 59 Years.  Comments:  10/15/2010 9:54 AM - Dayan Guzmán - Stable  DEXA - Dual energy X-ray photon absorptiometry ([facility] CT 942949260) on 4/14/2010 at 59 Years.  Comments:  2/24/2011 10:33 AM - Marleen Sanz. Repeat  5 yrs. (Due )  HPV - Human papillomavirus test negative (SNOMED CT 7019832221) on 2010 at 59 Years.  Comments:  2011 10:34 AM - Marleen Sanz  Low risk for cervical cancer. OK to have pap q 3 yrs.  Colonoscopy (SNOMED CT 945320457) performed by Cristino Knight MD on 3/6/2007 at 56 Years.  Comments:  2015 3:33 PM - Kymberly IRENE, Rachael  Pt received IV sedation.    2011 10:31 AM - Marleen Sanz  Normal. Repeat 10 yrs if no new risk factors. (Due )  Breast biopsy sample (SNOMED CT 7058337127) in the month of 1998 at 47 Years.   section (SNOMED CT 59837426) in  at 36 Years.   Social History:       Physical Examination   Vital Signs   2018 4:41 PM CDT Temperature Tympanic 97.4 DegF  LOW    Peripheral Pulse Rate 56 bpm  LOW    Pulse Site Radial artery    HR Method Manual    Systolic Blood Pressure 120 mmHg    Diastolic Blood Pressure 64 mmHg    Mean Arterial Pressure 83 mmHg    BP Site Right arm    BP Method Manual      Measurements from flowsheet : Measurements   2018 4:41 PM CDT Height Measured - Standard 63.5 in    Weight Measured - Standard 124 lb    BSA 1.59 m2    Body Mass Index 21.62 kg/m2      General:  Alert and oriented, No acute distress.    Musculoskeletal:  Normal range of motion, Normal strength, Normal gait, tender inferior posterior pelvis.    Integumentary:  no bruising or swelling.    Psychiatric:  Appropriate mood & affect.       Review / Management   MRI pelvis May 2017: Hamstring Tendinopathy moderate high partial thickness and tearing greater on right side  MRI lumbar spine May 2017: L4-L5 central caudal 5mm herniation with L5 encroachment      Impression and Plan   Diagnosis     Musculoskeletal pain (AAS48-WE M79.1).     Likely chronic tendinopathy and refer to Sports Medicine      Professional Services   Dr Gavi Smith [phone]

## 2022-02-15 NOTE — LETTER
(Inserted Image. Unable to display)         February 09, 2021        LUCHO FOSTER   [address] [place], [place] [id number]        Dear LUCHO,    Thank you for selecting [facility] for your healthcare needs.    Our records indicate you are due for the following services:     Fasting Lab Tests ~ Please do not eat or drink anything 10 hours prior to your scheduled appointment time.  (Water and any medications that you may need are allowed unless directed otherwise.)     If you had your labs done at another facility or with Direct Access Lab Testing at [facility], please bring in a copy of the results to your next visit, mail a copy, or drop off a copy of your results to your Healthcare Provider.     (FYI   Regarding office visits: In some instances, a video visit or telephone visit may be offered as an option.)    To schedule an appointment or if you have further questions, please contact your clinic at [phone].    Powered by [employer] and [employer]    Sincerely,    Kirill Nj MD

## 2022-02-15 NOTE — LETTER
(Inserted Image. Unable to display)     January 20, 2020      LUCHO FOSTER   [address] [place], [place] [id number]          Dear LUCHO,      Thank you for selecting [facility] ([facility]viously [facility]) for your healthcare needs.    Our records indicate you are due for the following services:    Follow-up office visit.    Fasting Lab Tests ~ Please do not eat or drink anything 10 hours prior to your scheduled appointment time.  (Water and any medications that you may need are allowed unless directed otherwise.)    If you had your labs done at another facility or with Direct Access Lab Testing at [facility], please bring in a copy of the results to your next visit, mail a copy, or drop off a copy of your results to your Healthcare Provider.    You are due for lab work and an office visit; please schedule the lab appointment 1 week before the office visit.  This will assure all results are available to discuss with your provider during your visit.    **It is very helpful if you bring your medication bottles to your appointment.  This assures we have all of your current medications, including strength and dosing information, documented accurately in your medical record.    To schedule an appointment or if you have further questions, please contact your primary clinic:   [facility]       [phone]   [facility]       [phone]              [facility]     [phone]      Powered by [employer] and [employer]    Sincerely,    Kirill Nj MD

## 2022-02-15 NOTE — PROGRESS NOTES
Patient:   LUCHO GOLD            MRN: [id number]            FIN: [id number]               Age:   67 years     Sex:  Female     :  1950   Associated Diagnoses:   Dog bite of right lower leg   Author:   Radha Machado      Visit Information      Date of Service: 2018 08:16 am  Performing Location: [facility]  Encounter#: [id number]      Primary Care Provider (PCP):  Kirill Nj MD    NPI# [id number]      Referring Provider:  Radha Machado    NPI# [id number]      Chief Complaint   2018 8:23 AM CDT     got bit by a dog yesterday on back of right leg while riding bike, pt states that dog is up to date on shots        History of Present Illness   confirmed chief complaint with patient  She did talk with the owner and he produced the paperwork on the rabies vaccine being UTD  She washed with with soap and water in the shower  She had a tetanus booster in 2016 at the pharmacy  Her leg is a little sore but no decrease in sensation or nerve pain  bleeding has been well controlled         Review of Systems             Health Status   Allergies:    Allergic Reactions (Selected)  No Known Medication Allergies   Medications:  (Selected)   Prescriptions  Prescribed  Orthotics: Orthotics, See Instructions, Instructions: Use as directed, Supply, # 1 EA, 0 Refill(s), Type: Maintenance  Premarin 0.625 mg/g vaginal cream with applicator: ( 1 g ), VAG, qPM, Instructions: uses about 2x a week, # 42.5 g, 3 Refill(s), Type: Maintenance, Pharmacy: [facility], 1 gm vag qpm,Instr:uses about 2x a week  Provera 10 mg oral tablet: 1 tab(s) ( 10 mg ), po, daily, Instructions: as directed by provider, # 12 tab(s), 4 Refill(s), Type: Maintenance, Pharmacy: [facility], 1 tab(s) po daily,Instr:as directed by provider  conjugated estrogens 0.3 mg oral tablet: 1 tab(s) ( 0.3 mg ), PO, Daily, # 90 tab(s), 3 Refill(s), Type: Maintenance, Pharmacy: [facility], Appoinement scheduled for , 1 tab(s)  "po daily  estradiol-levonorgestrel 0.045 mg-0.015 mg/24 hr transdermal film, extended release: 1 patch(es), top, qweek, Instructions: fill if decides to change from oral, # 12 patch(es), 3 Refill(s), Type: Maintenance, Pharmacy: [facility], 1 patch(es) top qweek,Instr:fill if decides to change from oral  fluticasone 50 mcg/inh nasal spray: 2 spray(s), nasal, daily, # 2 EA, 5 Refill(s), Type: Maintenance, Pharmacy: [facility], will need an appt for further refills., 2 spray(s) nasal daily  ibuprofen 600 mg oral tablet: 1 tab(s) ( 600 mg ), po, q8 hrs, PRN: as needed for pain, # 45 tab(s), 2 Refill(s), Type: Maintenance, Pharmacy: [facility], 1 tab(s) po q8 hrs,PRN:as needed for pain  metroNIDAZOLE 0.75% topical gel: 1 annie, TOP, daily, # 45 g, 3 Refill(s), Type: Maintenance, Pharmacy: [facility], 1 annie top daily   Problem list:    All Problems  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   Family History:    Dementia  Mother (\"Jinx\", )  Diabetes mellitus  Father ()  Breast cancer  Mother (\"Jinx\", ): onset at 87 .  Stroke  Mother (\"Jinx\", ): onset at 74 .  Comments:  2014 2:53 PM - Marleen Sanz  On Coumadin following CVA.  AIDS - Acquired immunodeficiency syndrome  Brother  CVA - Cerebrovascular accident  Brother: onset at 58 .  Comments:  2013 3:27 PM - Jaison KOLB, Marleen  Non-smoker. Unknown cause. Good recovery.  Sarcoidosis  Father ()  Alzheimer's " disease  Grandmother (P)     Procedure history:    HPV - Human papillomavirus test negative (SNOMED CT 0271212054) on 2013 at 62 Years.  Comments:  2013 4:51 PM - Marleen Sanz  Low risk for cervical cancer. Repeat 3 yrs (age 65), then if neg may stop pap screening.  Bone density scan (SNOMED CT 845301142) on 2010 at 59 Years.  Comments:  10/15/2010 9:54 AM - Dayan Guzmán - Stable  DEXA - Dual energy X-ray photon absorptiometry (SNOMED CT 003046858) on 2010 at 59 Years.  Comments:  2011 10:33 AM - Marleen Sanz. Repeat 5 yrs. (Due )  HPV - Human papillomavirus test negative (SNOMED CT 5357108172) on 2010 at 59 Years.  Comments:  2011 10:34 AM - Marleen Sanz  Low risk for cervical cancer. OK to have pap q 3 yrs.  Colonoscopy (SNOMED CT 265161230) performed by Cristino Knight MD on 3/6/2007 at 56 Years.  Comments:  2015 3:33 PM - Rachael Traylor MA  Pt received IV sedation.    2011 10:31 AM - Marleen Sanz. Repeat 10 yrs if no new risk factors. (Due )  Breast biopsy sample (SNOMED CT 6136357294) in the month of 1998 at 47 Years.   section (SNOMED CT 35903154) in  at 36 Years.   Social History:        Alcohol Assessment: Current            Current, Wine (5 oz), 6 times per week, 1 drinks/episode average.  2 drinks/episode maximum.      Tobacco Assessment: Denies Tobacco Use            Past                     Comments:                      2015 - Carol Mendes                     smoked for 10 years 8010-1448      Substance Abuse Assessment: Denies Substance Abuse            Never      Employment and Education Assessment            Retired      Home and Environment Assessment            Spouse/Partner name: Gilberto Partida.  1 children.      Nutrition and Health Assessment                     Comments:                      2015 - Batsheva PRESSLEY, Mercedez                      Mostly gluten Free      Exercise and Physical Activity Assessment: Regular exercise            Exercise frequency: 5-6 times/week.  Exercise type: Bicycling, Running, Swimming, Weight lifting, Yoga.      Sexual Assessment            Sexually active: Yes.        Physical Examination   Vital Signs   7/2/2018 8:23 AM CDT Temperature Tympanic 97.4 DegF  LOW    Peripheral Pulse Rate 58 bpm  LOW    Pulse Site Radial artery    HR Method Manual    Systolic Blood Pressure 122 mmHg    Diastolic Blood Pressure 70 mmHg    Mean Arterial Pressure 87 mmHg    BP Site Right arm    BP Method Manual      Measurements from flowsheet : Measurements   7/2/2018 8:23 AM CDT Height Measured - Standard 63.5 in    Weight Measured - Standard 124 lb    BSA 1.59 m2    Body Mass Index 21.62 kg/m2      General:  Alert and oriented, She has two puncture wounds in the popliteal area of the right leg, they  are scabbing but it broke open when she touched it and I cleaned it with water and covered with bandaia. There is some echymosis but minimal erythem. Good CMS in the right leg.       Impression and Plan   Diagnosis     Dog bite of right lower leg (IKZ92-CI S81.851A).     Plan:  she will clean bit and applied ice  may be  alittle worse tomorrow but if redness hasn't started to improve and pain started to improve in 2 days she should start antibiotic with probiotic (rx sent to [facility]).    Patient Instructions:       Counseled: Patient, Regarding diagnosis, Regarding treatment, Regarding medications, Verbalized understanding, Counseled on symptomatic management. Return to clinic for re evaluation if worsening, simply not improving, or failure to resolve.   utd tetanus  keep clean and covered, ice.    Orders     Orders (Selected)   Prescriptions  Prescribed  Augmentin 875 mg oral tablet: 1 tab(s), PO, q12hr, Instructions: start if needed for infection from dog bit  with food or milk, # 20 tab(s), 0 Refill(s), Type: Maintenance, Pharmacy: [facility], 1 tab(s) Oral q12 hrs,x10 day(s),Instr:start if needed for infection from dog bit; w....

## 2022-02-15 NOTE — PROGRESS NOTES
Patient:   LUCHO GOLD            MRN: [id number]            FIN: [id number]               Age:   70 years     Sex:  Female     :  1950   Associated Diagnoses:   Encounter for screening for other viral diseases   Author:   Geoffrey Garcia PA-C      Visit Information      Date of Service: 2020 03:19 pm  Performing Location: [facility]  Encounter#: [id number]      Primary Care Provider (PCP):  Kirill Nj MD    NPI# [id number]   Visit type:  Telephone Encounter.    Source of history:  Patient.    Location of patient:  _home  Call Start Time:   _1529  Call End Time:    _153      Chief Complaint   2020 3:20 PM CST    c/o fatigue, headache sx present since . Verbal consent given for telephone visit.     _      History of Present Illness   Today's visit was conducted via telephone due to the COVID-19 pandemic. Patient's consent to telephone visit was obtained and documented.      Reason for visit:  _  3 day hx of fatigue, muscle aches, headaches  no fever or cough  no known exposure to Covid  her  has health issues      Review of Systems   Constitutional:  Fatigue, body aches, No fever, No chills.    Respiratory:  Negative.    Neurologic:  Headache.       Impression and Plan   Diagnosis     Encounter for screening for other viral diseases (SGJ66-ES Z11.59).     Summary:  Patient is referred for [facility] COVID-19 testing and is instructed of the following:  Patient should remain isolated until results of test return and given that tests are not 100% accurate, would be safest to assume that they are contagious with COVID-19 until their symptoms have fully resolved. Isolation is recommended for at least 14 days from the onset of symptoms and for 3 days after resolution of fevers and productive cough. This means patient should not go to work or any public areas. In addition, it is recommended at home that they separate themselves from other people and from animals as much as  possible, including using a separate bathroom. If they do need to be around others, a facemask is recommended. Frequent hand hygiene and cleaning of high touch surfaces is also recommended.   Symptoms can last for several weeks. For patients with COVID-19, they can sometimes start to improve and then get worse again. If symptoms worsen at any time, including significant shortness of breath, low oxygen levels, high fevers that cannot be controlled, or concerns for dehydration, they should seek medical care. If going to the ER, calling 911, or seeking care at the clinic, they are reminded to notify staff that they have been tested for COVID-19.  Patient also is informed that testing will be done in their car at a scheduled time. Test will be sent to an outside commercial lab and billed by that lab. [employer] cannot confirm to patient how billing will be handled by their insurance company.    Patient is also informed that testing for COVID-19 must be reported to the public health department along with contact information for the patient.   Patient information is given to scheduling staff to get patient scheduled for testing. Patient will receive further instructions from scheduling staff.  Patient is encouraged to call back at any time with questions or concerns.  .    Orders     Orders   Lab (Gen Lab  Reference Lab):  SARS-CoV-2 RNA (COVID-19), Qualitative NAAT* ([facility]) (Order): Specimen Type: Anterior Nares, Collection Date: 11/5/2020 3:32 PM CST.     Orders   Charges (Evaluation and Management):  23160 physician telephone evaluation 5-10 min (Charge) (Order): Quantity: 1, Encounter for screening for other viral diseases.        Health Status   Allergies:    Allergic Reactions (Selected)  No Known Medication Allergies   Medications:  (Selected)   Prescriptions  Prescribed  Orthotics: Orthotics, See Instructions, Instructions: Use as directed, Supply, # 1 EA, 0 Refill(s), Type: Maintenance  Premarin 0.625 mg/g  "vaginal cream with applicator: ( 1 g ), VAG, qPM, Instructions: uses about about 1-2x a month but might increase if tapers off oral premarin, # 42.5 g, 3 Refill(s), Type: Maintenance, Pharmacy: [facility], 1 gm Vaginal qpm,Instr:uses about about 1-2x a month but might increase if...  Provera 10 mg oral tablet: = 1 tab(s) ( 10 mg ), po, daily, Instructions: as directed by provider, # 12 tab(s), 4 Refill(s), Type: Maintenance, Pharmacy: [facility], 1 tab(s) Oral daily,Instr:as directed by provider  conjugated estrogens 0.3 mg oral tablet: = 1 tab(s) ( 0.3 mg ), PO, Daily, # 95 tab(s), 3 Refill(s), Type: Maintenance, Pharmacy: [facility], 1 tab(s) Oral daily  fluticasone 50 mcg/inh nasal spray: = 2 spray(s), Nasal, daily, # 16 gm, 11 Refill(s), Type: Maintenance, Pharmacy: [facility], due for visit for further refills, 2 spray(s) Nasal daily,x30 day(s)  ibuprofen 600 mg oral tablet: 1 tab(s) ( 600 mg ), po, q8 hrs, PRN: as needed for pain, # 45 tab(s), 2 Refill(s), Type: Maintenance, Pharmacy: [facility], 1 tab(s) po q8 hrs,PRN:as needed for pain  metroNIDAZOLE 0.75% topical gel: 1 annie, TOP, daily, # 45 g, 3 Refill(s), Type: Maintenance, Pharmacy: [facility], 1 annie Topical daily   Problem list:    All Problems  History of rosacea / SNOMED CT 9637762637 / Confirmed  Tobacco Abuse, in Remission / ICD-9-.1 / Confirmed  Prediabetes / SNOMED CT 7469688095 / Confirmed  Urethral syndrome / SNOMED CT 08451431 / Confirmed      Histories   Past Medical History:    Active  Urethral syndrome (83707752)  Tobacco Abuse, in Remission (305.1)  History of rosacea (3738806705)  Resolved  History of chicken pox (933188876):  Resolved.   Family History:    Dementia  Mother (\"Jinx\", )  Diabetes mellitus  Father ()  Breast cancer  Mother (\"Jinx\", ): onset at 87 .  Stroke  Mother (\"Jinx\", ): onset at 74 .  Comments:  2014 2:53 PM GEO - Marleen Sanz  On Coumadin following " CVA.  AIDS - Acquired immunodeficiency syndrome  Brother  CVA - Cerebrovascular accident  Brother: onset at 58 .  Comments:  2013 3:27 PM Marleen Stratton  Non-smoker. Unknown cause. Good recovery.  Sarcoidosis  Father ()  Alzheimer's disease  Grandmother (P)     Procedure history:    HPV - Human papillomavirus test negative (6594219024) on 2013 at 62 Years.  Comments:  2013 4:51 PM Marleen Stratton  Low risk for cervical cancer. Repeat 3 yrs (age 65), then if neg may stop pap screening.  Bone density scan ([id number]) on 2010 at 59 Years.  Comments:  10/15/2010 9:54 AM GEO - Dayan Guzmán - Stable  DEXA - Dual energy X-ray photon absorptiometry ([id number]) on 2010 at 59 Years.  Comments:  2011 10:33 AM Marleen Emerson. Repeat 5 yrs. (Due )  HPV - Human papillomavirus test negative (9088983832) on 2010 at 59 Years.  Comments:  2011 10:34 AM Marleen Emerson  Low risk for cervical cancer. OK to have pap q 3 yrs.  Colonoscopy ([id number]) on 3/6/2007 at 56 Years.  Comments:  2015 3:33 PM CDT - Rachael Traylor MA  Pt received IV sedation.    2011 10:31 AM Marleen Emerson. Repeat 10 yrs if no new risk factors. (Due )  Breast biopsy sample ([id number]) in the month of 1998 at 47 Years.   section ([id number]) in  at 36 Years.   Social History:        Alcohol Assessment: Current            Current, Wine (5 oz), 6 times per week, 1 drinks/episode average.  2 drinks/episode maximum.      Tobacco Assessment: Denies Tobacco Use            Past                     Comments:                      2015 - Cinthya Carol                     smoked for 10 years 2712-5875      Substance Abuse Assessment: Denies Substance Abuse            Never      Employment and Education Assessment            Retired      Home and Environment Assessment             Spouse/Partner name: Gilberto Partida.  1 children.      Nutrition and Health Assessment                     Comments:                      08/26/2015 - Batsheva PRESSLEY, Mercedez                     Mostly gluten Free      Exercise and Physical Activity Assessment: Regular exercise            Exercise frequency: 5-6 times/week.  Exercise type: Bicycling, Running, Swimming, Weight lifting, Yoga.      Sexual Assessment            Sexually active: Yes.        Physical Examination   Measurements from flowsheet : Measurements   11/5/2020 3:20 PM CST    Height/Length Estimated   63.5

## 2022-02-15 NOTE — PROGRESS NOTES
Chief Complaint    right hand fifth finger bent, neck stiff, need fit card.  History of Present Illness       Patient is here with complaints of neck stiffness.  She is noted progressive loss of range of motion of the neck.  She has difficulty with extension and flexion of the neck.  She denies pain.  She has tightness of the trapezii bilaterally.  She denies any upper extremity symptoms.       She has no progressive flexion of the right fifth finger.  This become more of a problem the last several months.  Review of Systems       See HPI.  All other review of systems negative.  Physical Exam   Vitals & Measurements    T: 98.0  F (Tympanic)  HR: 68 (Peripheral)  BP: 110/62     HT: 63.5 in  HT: 63.5  WT: 121 lb  BMI: 21.1        Alert, oriented, no acute distress       Decreased range of motion in flexion extension lateral movement of the neck.  She has tenderness of the trapezii bilaterally       Upper extremity neurologic exam is unremarkable with normal strength and range of motion normal sensation and DTRs       Exam of the right hand reveals palmar fibromatosis with mild full flexion contracture of the fifth finger       Cervical spine x-ray reveals fair amount of degenerative changes.  She has loss of disc height and near fusion of cervical 4 and 5  Assessment/Plan       1. 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)          Flexion contracture has increased, referred to hand surgery         Ordered:          Referral (Request), 07/26/21 14:50:00 CDT, Referred to: Orthopaedics, Referred to: hand surgery, Palmar fascial fibromatosis           Patient Information     Name:LUCHO GOLD       Address:      [address] [id number]     Sex:Female     YOB: 1950     [phone]     Emergency Contact:AMANDA EMMANUEL     MRN:[id number]     FIN:[id number]     Location:[facility]     Date of Service:2021      Primary Care Physician:       Kirill Nj MD, [phone]      Attending Physician:       Kirill Jackson MD, [phone]  Problem List/Past Medical History    Ongoing     ASCUS on PAP Smear     History of rosacea     Prediabetes     Tobacco Abuse, in Remission     Urethral syndrome    Historical     History of chicken pox  Procedure/Surgical History     HPV - Human papillomavirus test negative (2013)      Comments: Low risk for cervical cancer. Repeat 3 yrs (age 65), then if neg may stop pap screening..     Bone density scan (2010)      Comments: Normal - Stable.     DEXA - Dual energy X-ray photon absorptiometry (2010)      Comments: Normal. Repeat 5 yrs. (Due ).     HPV - Human papillomavirus test negative (2010)      Comments: Low risk for cervical cancer. OK to have pap q 3 yrs..     Colonoscopy (2007)      Comments: Normal. Repeat 10 yrs if no new risk factors. (Due ). Pt received IV sedation..     Breast biopsy sample (1998)      section ()  Medications    fluticasone 50 mcg/inh nasal spray, 2 spray(s), Nasal, daily, 11 refills    ibuprofen 600 mg oral tablet, 600 mg= 1 tab(s), Oral, q8 hrs, PRN, 2 refills    metroNIDAZOLE 0.75% topical gel, 1 annie, Topical, daily, 3 refills    Orthotics, See Instructions    Premarin 0.625 mg/g vaginal cream with applicator, 1 gm, Vaginal, qpm, 3 refills  Allergies    No Known Medication Allergies  Social History    Smoking Status     Former smoker     Alcohol - Current      Current, Wine (5 oz), 6 times per week, 1 drinks/episode average. 2 drinks/episode maximum.     Electronic Cigarette/Vaping      Electronic Cigarette Use: Never.      Employment/School      Retired     Exercise - Regular exercise      Exercise frequency: 5-6 times/week. Exercise type: Bicycling, Running, Swimming, Weight lifting, Yoga.     Home/Environment      Spouse/Partner name: Gilberto Partida. 1 children.     Nutrition/Health     Sexual      Sexually active: Yes.     Substance Abuse - Denies Substance Abuse      Never     Tobacco - Past      Former smoker, quit more than 30 days ago  Family History    AIDS - Acquired immunodeficiency syndrome: Brother.    Alzheimer's disease: Grandmother (P).    Breast cancer: Mother (Dx at 87).    CVA - Cerebrovascular accident: Brother (Dx at 58).    Dementia: Mother.    Diabetes mellitus: Father.    Sarcoidosis: Father.    Stroke: Mother (Dx at 74).  Immunizations          Scheduled Immunizations          Dose Date(s)          influenza virus vaccine, inactivated          11/19/2003, 11/30/2005, 11/07/2017, 10/31/2019          SARS-CoV-2 (COVID-19) Pfizer-162b2          01/27/2021, 02/17/2021          zoster vaccine, inactivated          04/04/2019, 10/22/2019          Other Immunizations          Hep B          08/25/2006, 09/22/2006, 06/05/2007          influenza          11/29/2010          IPV          08/30/2006          typhoid, inactivated          08/30/2006          influenza virus vaccine, inactivated          10/20/2011, 10/20/2011, 11/20/2012, 11/22/2013, 01/05/2015, 10/27/2015, 10/11/2016, 11/27/2020          hepatitis A adult vaccine          08/25/2006, 10/02/2007          pneumococcal (PPSV23)          10/27/2015          tetanus/diphth/pertuss (Tdap) adult/adol          08/01/2006, 05/26/2016          influenza, H1N1, inactivated          02/04/2010          pneumococcal (PCV13)          11/09/2016, 06/06/2018

## 2022-02-15 NOTE — PROGRESS NOTES
Chief Complaint    c/o right arm pain denies any injury  History of Present Illness       Right arm pain when reaching for a coffee mug hurts in the upper arm. Started hurting a few months ago. Denies any injury. Weights do not trigger it. Possible triggered by swimming.       Going to PT for neck exercises.  Review of Systems       No weakness in the arm       No numbness or tingling.  Physical Exam   Vitals & Measurements    T: 97.1  F (Tympanic)  HR: 64 (Peripheral)  BP: 108/66     HT: 63.5 in  HT: 63.5  WT: 120.1 lb  BMI: 20.94        General: no acute distress       Musculoskeletal: Rotator cuff muscles good. Mild Mendoza positive. Neer negative. Speed's positive. Yergason negative       Skin: no bruising or swelling  Assessment/Plan       Right arm Pain: possible biceps tendinitis. Ibuprofen 400mg 3x daily with food for 2 weeks. Physical therapy and shoulder exercises with theraband given  Patient Information     Name:LUCHO GOLD      Address:      [address] [id number]     Sex:Female     YOB: 1950     [phone]     Emergency Contact:AMANDA EMMANUEL     MRN:[id number]     FIN:[id number]     Location:[facility]     Date of Service:08/23/2021      Primary Care Physician:       Kirill Nj MD, [phone]      Attending Physician:       Kirill Nj MD, [phone]  Problem List/Past Medical History    Ongoing     ASCUS on PAP Smear     History of rosacea     Prediabetes     Tobacco Abuse, in Remission     Urethral syndrome    Historical     History of chicken pox  Procedure/Surgical History     HPV - Human papillomavirus test negative (05/30/2013)      Comments: Low risk for cervical cancer. Repeat 3 yrs (age 65), then if neg may stop pap screening..     Bone density scan (04/20/2010)      Comments: Normal - Stable.     DEXA - Dual energy X-ray photon absorptiometry (04/14/2010)      Comments: Normal. Repeat 5 yrs. (Due 2015).     HPV  - Human papillomavirus test negative (2010)      Comments: Low risk for cervical cancer. OK to have pap q 3 yrs..     Colonoscopy (2007)      Comments: Normal. Repeat 10 yrs if no new risk factors. (Due ). Pt received IV sedation..     Breast biopsy sample (1998)      section ()  Medications    fluticasone 50 mcg/inh nasal spray, 2 spray(s), Nasal, daily, 11 refills    ibuprofen 600 mg oral tablet, 600 mg= 1 tab(s), Oral, q8 hrs, PRN, 2 refills    metroNIDAZOLE 0.75% topical gel, 1 annie, Topical, daily, 3 refills    Orthotics, See Instructions    Premarin 0.625 mg/g vaginal cream with applicator, 1 gm, Vaginal, qpm, 3 refills  Allergies    No Known Medication Allergies

## 2022-02-15 NOTE — PROGRESS NOTES
Patient:   LUCHO GOLD            MRN: [id number]            FIN: [id number]               Age:   69 years     Sex:  Female     :  1950   Associated Diagnoses:   Vaginal discharge   Author:   Kirill Nj MD      Visit Information      Date of Service: 2020 11:21 am  Performing Location: [facility]  Encounter#: [id number]      Primary Care Provider (PCP):  Kirill jN MD    NPI# [id number]      Referring Provider:  Kirill Nj MD, NPI# [id number]   Visit type:  Telephone Encounter.    Source of history:  Patient.    Location of patient:  Home  Call Start Time:   1504  Call End Time:    _1510      Chief Complaint   2020 2:23 PM CDT     Patient c/o clear vaginal discharge since February. No color, itching, odor or urinary concerns. Verbal consent granted for video visit.   _      History of Present Illness   Today's visit was conducted via telephone due to the COVID-19 pandemic. Patient's consent to telephone visit was obtained and documented.      Reason for visit:  Shortly after last visit developed some vaginal discharge. Denies vaginal itching, burning and odor. Has had some discharge in the past. Denies bleeding. Notices some discharge in underwear. Has been biking alot lately.      Review of Systems   Constitutional:  No fever.       Impression and Plan   Diagnosis     Vaginal discharge (OGP14-GA N89.8).     Vaginal discharge: likely a normal vaginal discharge given on HRT. No concerning symptoms for infection or other problems. Monitor and follow up if other concerns. Offered GYN consult for ultrasound if concerned about it         Health Status   Allergies:    Allergic Reactions (Selected)  No Known Medication Allergies   Medications:  (Selected)   Prescriptions  Prescribed  Orthotics: Orthotics, See Instructions, Instructions: Use as directed, Supply, # 1 EA, 0 Refill(s), Type: Maintenance  Premarin 0.625 mg/g vaginal cream with applicator: ( 1 g ), VAG, qPM,  Instructions: uses about about 1-2x a month but might increase if tapers off oral premarin, # 42.5 g, 3 Refill(s), Type: Maintenance, Pharmacy: [facility], 1 gm Vaginal qpm,Instr:uses about about 1-2x a month but might increase if...  Provera 10 mg oral tablet: = 1 tab(s) ( 10 mg ), po, daily, Instructions: as directed by provider, # 12 tab(s), 4 Refill(s), Type: Maintenance, Pharmacy: [facility], 1 tab(s) Oral daily,Instr:as directed by provider  conjugated estrogens 0.3 mg oral tablet: = 1 tab(s) ( 0.3 mg ), PO, Daily, # 95 tab(s), 3 Refill(s), Type: Maintenance, Pharmacy: [facility], 1 tab(s) Oral daily  fluticasone 50 mcg/inh nasal spray: = 2 spray(s), Nasal, daily, # 16 gm, 11 Refill(s), Type: Maintenance, Pharmacy: [facility], due for visit for further refills, 2 spray(s) Nasal daily,x30 day(s)  ibuprofen 600 mg oral tablet: 1 tab(s) ( 600 mg ), po, q8 hrs, PRN: as needed for pain, # 45 tab(s), 2 Refill(s), Type: Maintenance, Pharmacy: [facility], 1 tab(s) po q8 hrs,PRN:as needed for pain  metroNIDAZOLE 0.75% topical gel: 1 annie, TOP, daily, # 45 g, 3 Refill(s), Type: Maintenance, Pharmacy: [facility], 1 annie Topical daily   Problem list:    All Problems  History of rosacea / SNOMED CT 2856838685 / Confirmed  Prediabetes / SNOMED CT 7616074338 / Confirmed  Tobacco Abuse, in Remission / ICD-9-.1 / Confirmed  Urethral syndrome / SNOMED CT 04542823 / Confirmed

## 2022-02-15 NOTE — NURSING NOTE
Comprehensive Intake Entered On:  7/26/2021 2:11 PM CDT    Performed On:  7/26/2021 2:05 PM CDT by Wendy Slaughter LPN               Summary   Chief Complaint :   right hand fifth finger bent, neck stiff, need fit card.    Menstrual Status :   Postmenopausal   Weight Measured :   121 lb(Converted to: 121 lb 0 oz, 54.885 kg)    Height Measured :   63.5 in(Converted to: 5 ft 3 in, 161.29 cm)    Body Mass Index :   21.1 kg/m2   Body Surface Area :   1.57 m2   Height/Length Estimated :   63.5    Systolic Blood Pressure :   110 mmHg   Diastolic Blood Pressure :   62 mmHg   Mean Arterial Pressure :   78 mmHg   Peripheral Pulse Rate :   68 bpm   BP Site :   Right arm   Pulse Site :   Radial artery   BP Method :   Manual   HR Method :   Manual   Temperature Tympanic :   98.0 DegF(Converted to: 36.7 DegC)    Race :      Ethnicity :   Not  or    Wendy Slaughter LPN - 7/26/2021 2:05 PM CDT   Health Status   Allergies Verified? :   Yes   Medication History Verified? :   Yes   Pre-Visit Planning Status :   Completed   Tobacco Use? :   Former smoker   Wendy Slaughter LPN - 7/26/2021 2:05 PM CDT   Consents   Consent for Immunization Exchange :   Consent Granted   Consent for Immunizations to Providers :   Consent Granted   Wendy Slaughter LPN - 7/26/2021 2:05 PM CDT   Meds / Allergies   (As Of: 7/26/2021 2:11:43 PM CDT)   Allergies (Active)   No Known Medication Allergies  Estimated Onset Date:   Unspecified ; Created By:   Ernesto Sparks CMA; Reaction Status:   Active ; Category:   Drug ; Substance:   No Known Medication Allergies ; Type:   Allergy ; Updated By:   Ernesto Sparks CMA; Reviewed Date:   7/26/2021 2:10 PM CDT        Medication List   (As Of: 7/26/2021 2:11:43 PM CDT)   Prescription/Discharge Order    conjugated estrogens  :   conjugated estrogens ; Status:   Completed ; Ordered As Mnemonic:   conjugated estrogens 0.3 mg oral tablet ; Simple Display Line:   0.3 mg, 1 tab(s),  PO, Daily, 95 tab(s), 3 Refill(s) ; Ordering Provider:   Kirill Nj MD; Catalog Code:   conjugated estrogens ; Order Dt/Tm:   2/4/2020 2:55:33 PM CST          conjugated estrogens topical  :   conjugated estrogens topical ; Status:   Prescribed ; Ordered As Mnemonic:   Premarin 0.625 mg/g vaginal cream with applicator ; Simple Display Line:   1 g, VAG, qPM, uses about about 1-2x a month but might increase if tapers off oral premarin, 42.5 g, 3 Refill(s) ; Ordering Provider:   Kirill Nj MD; Catalog Code:   conjugated estrogens topical ; Order Dt/Tm:   2/4/2020 2:56:27 PM CST          fluticasone nasal  :   fluticasone nasal ; Status:   Prescribed ; Ordered As Mnemonic:   fluticasone 50 mcg/inh nasal spray ; Simple Display Line:   2 spray(s), Nasal, daily, for 30 day(s), 16 gm, 11 Refill(s) ; Ordering Provider:   Kirill Nj MD; Catalog Code:   fluticasone nasal ; Order Dt/Tm:   2/4/2020 2:57:34 PM CST          ibuprofen  :   ibuprofen ; Status:   Prescribed ; Ordered As Mnemonic:   ibuprofen 600 mg oral tablet ; Simple Display Line:   600 mg, 1 tab(s), po, q8 hrs, PRN: as needed for pain, 45 tab(s), 2 Refill(s) ; Ordering Provider:   Kirill Nj MD; Catalog Code:   ibuprofen ; Order Dt/Tm:   5/24/2017 3:03:55 PM CDT          medroxyPROGESTERone  :   medroxyPROGESTERone ; Status:   Completed ; Ordered As Mnemonic:   Provera 10 mg oral tablet ; Simple Display Line:   10 mg, 1 tab(s), po, daily, as directed by provider, 12 tab(s), 4 Refill(s) ; Ordering Provider:   Kirill Nj MD; Catalog Code:   medroxyPROGESTERone ; Order Dt/Tm:   2/4/2020 2:56:02 PM CST          metroNIDAZOLE topical  :   metroNIDAZOLE topical ; Status:   Prescribed ; Ordered As Mnemonic:   metroNIDAZOLE 0.75% topical gel ; Simple Display Line:   1 annie, TOP, daily, 45 g, 3 Refill(s) ; Ordering Provider:   Kirill Nj MD; Catalog Code:   metroNIDAZOLE topical ; Order Dt/Tm:   2/4/2020 2:56:17 PM CST          Miscellaneous Rx  Supply  :   Miscellaneous Rx Supply ; Status:   Prescribed ; Ordered As Mnemonic:   Orthotics ; Simple Display Line:   See Instructions, Use as directed, 1 EA, 0 Refill(s) ; Ordering Provider:   Kirill Nj MD; Catalog Code:   Miscellaneous Rx Supply ; Order Dt/Tm:   5/24/2017 3:07:47 PM CDT

## 2022-02-15 NOTE — NURSING NOTE
Comprehensive Intake Entered On:  10/18/2021 12:52 PM CDT    Performed On:  10/18/2021 12:44 PM CDT by Jocelyn Telles CMA               Summary   Chief Complaint :   f/u right shoulder pain, PT not helping and pain getting worse and limited ROM    Menstrual Status :   Postmenopausal   Weight Measured :   121.1 lb(Converted to: 121 lb 2 oz, 54.930 kg)    Height Measured :   63.5 in(Converted to: 5 ft 3 in, 161.29 cm)    Body Mass Index :   21.11 kg/m2   Body Surface Area :   1.57 m2   Height/Length Estimated :   63.5    Systolic Blood Pressure :   110 mmHg   Diastolic Blood Pressure :   64 mmHg   Mean Arterial Pressure :   79 mmHg   Peripheral Pulse Rate :   62 bpm   BP Site :   Right arm   Pulse Site :   Radial artery   BP Method :   Manual   HR Method :   Manual   Temperature Tympanic :   97.7 DegF(Converted to: 36.5 DegC)  (LOW)    Race :      Ethnicity :   Not  or    Jocelyn Telles CMA - 10/18/2021 12:44 PM CDT   Health Status   Allergies Verified? :   Yes   Medication History Verified? :   Yes   Medical History Verified? :   No   Pre-Visit Planning Status :   Not completed   Tobacco Use? :   Former smoker   Jocelyn Telles CMA - 10/18/2021 12:44 PM CDT   Consents   Consent for Immunization Exchange :   Consent Granted   Consent for Immunizations to Providers :   Consent Granted   Jocelyn Telles CMA - 10/18/2021 12:44 PM CDT   Meds / Allergies   (As Of: 10/18/2021 12:52:27 PM CDT)   Allergies (Active)   No Known Medication Allergies  Estimated Onset Date:   Unspecified ; Created By:   Ernesto Sparks CMA; Reaction Status:   Active ; Category:   Drug ; Substance:   No Known Medication Allergies ; Type:   Allergy ; Updated By:   Ernesto Sparks CMA; Reviewed Date:   10/18/2021 12:47 PM CDT        Medication List   (As Of: 10/18/2021 12:52:27 PM CDT)   Prescription/Discharge Order    conjugated estrogens topical  :   conjugated estrogens topical ; Status:   Completed ; Ordered As Mnemonic:    Premarin 0.625 mg/g vaginal cream with applicator ; Simple Display Line:   1 g, VAG, qPM, uses about about 1-2x a month but might increase if tapers off oral premarin, 42.5 g, 3 Refill(s) ; Ordering Provider:   Kirill Nj MD; Catalog Code:   conjugated estrogens topical ; Order Dt/Tm:   2/4/2020 2:56:27 PM CST          fluticasone nasal  :   fluticasone nasal ; Status:   Prescribed ; Ordered As Mnemonic:   fluticasone 50 mcg/inh nasal spray ; Simple Display Line:   2 spray(s), Nasal, daily, for 30 day(s), 16 gm, 11 Refill(s) ; Ordering Provider:   Kirill Nj MD; Catalog Code:   fluticasone nasal ; Order Dt/Tm:   2/4/2020 2:57:34 PM CST          ibuprofen  :   ibuprofen ; Status:   Prescribed ; Ordered As Mnemonic:   ibuprofen 600 mg oral tablet ; Simple Display Line:   600 mg, 1 tab(s), po, q8 hrs, PRN: as needed for pain, 45 tab(s), 2 Refill(s) ; Ordering Provider:   Kirill Nj MD; Catalog Code:   ibuprofen ; Order Dt/Tm:   5/24/2017 3:03:55 PM CDT          metroNIDAZOLE topical  :   metroNIDAZOLE topical ; Status:   Prescribed ; Ordered As Mnemonic:   metroNIDAZOLE 0.75% topical gel ; Simple Display Line:   1 annie, TOP, daily, 45 g, 3 Refill(s) ; Ordering Provider:   Kirill Nj MD; Catalog Code:   metroNIDAZOLE topical ; Order Dt/Tm:   2/4/2020 2:56:17 PM CST          Miscellaneous Rx Supply  :   Miscellaneous Rx Supply ; Status:   Prescribed ; Ordered As Mnemonic:   Orthotics ; Simple Display Line:   See Instructions, Use as directed, 1 EA, 0 Refill(s) ; Ordering Provider:   Kirill Nj MD; Catalog Code:   Miscellaneous Rx Supply ; Order Dt/Tm:   5/24/2017 3:07:47 PM CDT            Social History   Social History   (As Of: 10/18/2021 12:52:27 PM CDT)   Alcohol:  Current      Current, Wine (5 oz), 6 times per week, 1 drinks/episode average.  2 drinks/episode maximum.   (Last Updated: 10/28/2016 3:57:58 PM CDT by Carol Mendes)          Tobacco:  Past      Former smoker, quit more than 30  days ago   (Last Updated: 2/4/2021 8:22:05 AM CST by Jocelyn Telles CMA)          Electronic Cigarette/Vaping:        Electronic Cigarette Use: Never.   (Last Updated: 2/4/2021 8:22:11 AM CST by Jocelyn Telles CMA)          Substance Abuse:  Denies Substance Abuse      Never   (Last Updated: 8/26/2015 11:41:41 AM CDT by Mercedez Herman RN)          Employment/School:        Retired   (Last Updated: 8/26/2015 11:41:33 AM CDT by Mercedez Herman RN)          Home/Environment:        Spouse/Partner name: Gilberto Partida.  1 children.   (Last Updated: 8/26/2015 11:41:26 AM CDT by Mercedez Herman RN)          Nutrition/Health:         Comments:  8/26/2015 11:40 AM - Mercedez Herman RN: Mostly gluten Free   (Last Updated: 8/26/2015 11:40:48 AM CDT by Mercedez Herman RN)          Exercise:  Regular exercise      Exercise frequency: 5-6 times/week.  Exercise type: Bicycling, Running, Swimming, Weight lifting, Yoga.   (Last Updated: 8/27/2015 4:18:40 PM CDT by Carol Mendes)          Sexual:        Sexually active: Yes.   (Last Updated: 2/24/2011 10:27:36 AM CST by Marti Hooper CMA)

## 2022-02-15 NOTE — TELEPHONE ENCOUNTER
---------------------  From: Kirill Nj MD   To: LUCHO GOLD    Sent: 7/23/2021 3:28:17 PM CDT  Subject: mammogram     Dear Kaitlin    This letter is to inform you that we have received a copy of your mammogram results July 23rd, 2021.  Your results were normal.  You will also receive notice of your results from the radiologist within 7-10 days.  This letter is to let you know I have also received a copy and it is filed in your clinic chart.      Please plan on having your next mammogram done in 12 months.    Werner Nj M.D.

## 2022-02-15 NOTE — NURSING NOTE
Comprehensive Intake Entered On:  12/16/2021 1:45 PM CST    Performed On:  12/16/2021 1:41 PM CST by Jocelyn Telles CMA               Summary   Chief Complaint :   referral for PT for right hammstring pain    Menstrual Status :   Postmenopausal   Weight Measured :   127 lb(Converted to: 127 lb 0 oz, 57.606 kg)    Height Measured :   63.5 in(Converted to: 5 ft 3 in, 161.29 cm)    Body Mass Index :   22.14 kg/m2   Body Surface Area :   1.6 m2   Height/Length Estimated :   63.5    Systolic Blood Pressure :   108 mmHg   Diastolic Blood Pressure :   60 mmHg   Mean Arterial Pressure :   76 mmHg   Peripheral Pulse Rate :   68 bpm   BP Site :   Right arm   Pulse Site :   Radial artery   BP Method :   Manual   HR Method :   Electronic   Temperature Tympanic :   97 DegF(Converted to: 36.1 DegC)  (LOW)    Oxygen Saturation :   97 %   Race :      Ethnicity :   Not  or    Jocelyn Telles CMA - 12/16/2021 1:41 PM CST   Health Status   Allergies Verified? :   Yes   Medication History Verified? :   Yes   Medical History Verified? :   No   Pre-Visit Planning Status :   Not completed   Tobacco Use? :   Former smoker   Jocelyn Telles CMA - 12/16/2021 1:41 PM CST   Consents   Consent for Immunization Exchange :   Consent Granted   Consent for Immunizations to Providers :   Consent Granted   Jocelyn Telles CMA - 12/16/2021 1:41 PM CST   Meds / Allergies   (As Of: 12/16/2021 1:45:52 PM CST)   Allergies (Active)   No Known Medication Allergies  Estimated Onset Date:   Unspecified ; Created By:   Ernesto Sparks CMA; Reaction Status:   Active ; Category:   Drug ; Substance:   No Known Medication Allergies ; Type:   Allergy ; Updated By:   Ernesto Sparks CMA; Reviewed Date:   12/16/2021 1:43 PM CST        Medication List   (As Of: 12/16/2021 1:45:52 PM CST)   Prescription/Discharge Order    fluticasone nasal  :   fluticasone nasal ; Status:   Prescribed ; Ordered As Mnemonic:   fluticasone 50 mcg/inh nasal spray ;  Simple Display Line:   2 spray(s), Nasal, daily, for 30 day(s), 16 gm, 11 Refill(s) ; Ordering Provider:   Kirill Nj MD; Catalog Code:   fluticasone nasal ; Order Dt/Tm:   10/18/2021 1:18:30 PM CDT          ibuprofen  :   ibuprofen ; Status:   Prescribed ; Ordered As Mnemonic:   ibuprofen 600 mg oral tablet ; Simple Display Line:   600 mg, 1 tab(s), po, q8 hrs, PRN: as needed for pain, 45 tab(s), 2 Refill(s) ; Ordering Provider:   Kirill Nj MD; Catalog Code:   ibuprofen ; Order Dt/Tm:   5/24/2017 3:03:55 PM CDT          metroNIDAZOLE topical  :   metroNIDAZOLE topical ; Status:   Prescribed ; Ordered As Mnemonic:   metroNIDAZOLE 0.75% topical gel ; Simple Display Line:   1 annie, TOP, daily, 45 g, 3 Refill(s) ; Ordering Provider:   Kirill Nj MD; Catalog Code:   metroNIDAZOLE topical ; Order Dt/Tm:   2/4/2020 2:56:17 PM CST          Miscellaneous Rx Supply  :   Miscellaneous Rx Supply ; Status:   Prescribed ; Ordered As Mnemonic:   Orthotics ; Simple Display Line:   See Instructions, Use as directed, 1 EA, 0 Refill(s) ; Ordering Provider:   Kirill Nj MD; Catalog Code:   Miscellaneous Rx Supply ; Order Dt/Tm:   5/24/2017 3:07:47 PM CDT            Social History   Social History   (As Of: 12/16/2021 1:45:52 PM CST)   Alcohol:  Current      Current, Wine (5 oz), 6 times per week, 1 drinks/episode average.  2 drinks/episode maximum.   (Last Updated: 10/28/2016 3:57:58 PM CDT by Carol Mendes)          Tobacco:  Past      Former smoker, quit more than 30 days ago   (Last Updated: 2/4/2021 8:22:05 AM CST by Jocelyn Telles CMA)          Electronic Cigarette/Vaping:        Electronic Cigarette Use: Never.   (Last Updated: 2/4/2021 8:22:11 AM CST by Jocelyn Telles CMA)          Substance Abuse:  Denies Substance Abuse      Never   (Last Updated: 8/26/2015 11:41:41 AM CDT by Mercedez Herman RN)          Employment/School:        Retired   (Last Updated: 8/26/2015 11:41:33 AM CDT by Batsheva PRESSLEY,  Mercedez)          Home/Environment:        Spouse/Partner name: Gilberto Partida.  1 children.   (Last Updated: 8/26/2015 11:41:26 AM CDT by Mercedez Herman RN)          Nutrition/Health:         Comments:  8/26/2015 11:40 AM - Mercedez Herman RN: Mostly gluten Free   (Last Updated: 8/26/2015 11:40:48 AM CDT by Mercedez Herman RN)          Exercise:  Regular exercise      Exercise frequency: 5-6 times/week.  Exercise type: Bicycling, Running, Swimming, Weight lifting, Yoga.   (Last Updated: 8/27/2015 4:18:40 PM CDT by Carol Mendes)          Sexual:        Sexually active: Yes.   (Last Updated: 2/24/2011 10:27:36 AM CST by Marti Hooper CMA)

## 2022-02-15 NOTE — PROGRESS NOTES
"Chief Complaint    2 weeks, productive cough, chills, sore throat, sinus pain  History of Present Illness      Patient had a \"cold\" that she developed on Thanksgiving day with pharyngitis and cough.  Minimal fever, chills, myalgia, arthralgia.  She subsequently improved until about 3-4 days ago when she developed a productive cough.  The cough is subsequently improved.  Currently is without fever, chills, myalgias, headache.  Review of Systems      No chest pain, dyspnea, asthma, history of smoking.  Physical Exam   Vitals & Measurements    T: 97.2   F (Tympanic)  HR: 57(Peripheral)  BP: 125/77     HT: 63.5 in  WT: 123 lb  BMI: 21.44       Patient is a healthy-appearing woman in no distress.  Not tachypneic.  HEENT exam reveals normal oropharynx, nontender sinuses, clear TMs.  Neck supple no adenopathy or thyromegaly.  Chest clear to auscultation percussion.  Cardiac exam regular no murmur.  Assessment/Plan       Acute respiratory infection (J22)         Minimal pneumonia on chest x-ray plan.  Low risk patient.        Azithromycin and Atrovent nasal spray.  Return if fever, dyspnea, not promptly improving.         Orders:          79795 office outpatient visit 15 minutes (Charge), Quantity: 1, Acute respiratory infection          XR Chest PA/Lat (Request), Priority: STAT, Acute respiratory infection                Right lower lobe pneumonia (J18.1)         See above                Orders:         azithromycin, Take 2 tablets on Day 1 and then 1 tablet, PO, Daily, x 5 day(s), Instructions: until finished, # 6 tab(s), 0 Refill(s), Type: Acute, Pharmacy: [facility], Take 2 tablets on Day 1 and then 1 tablet Oral daily,x5 day(s),Instr:until finished, (Ordered)         ipratropium nasal, 2 spray(s), Nasal, QID, x 30 day(s), PRN: Other (see comment), # 1 EA, 1 Refill(s), Type: Acute, Pharmacy: [facility], 2 spray(s) Nasal qid,x30 day(s),PRN:Other (see comment), (Ordered)  Patient Information     Name:LUCHO GOLD " D      Address:      [address] [address]     Sex:Female     YOB: 1950     [phone]     Emergency Contact:MICHELLE AL     MRN:[id number]     FIN:[id number]     Location:[facility]     Date of Service:2018      Primary Care Physician:       Kirill Nj MD, [phone]      Attending Physician:       Paddy Aguilera MD, [phone]  Problem List/Past Medical History    Ongoing     ASCUS on PAP Smear     History of rosacea     Tobacco Abuse, in Remission     Urethral syndrome    Historical     History of chicken pox  Procedure/Surgical History     HPV - Human papillomavirus test negative (2013)      Comments:      Low risk for cervical cancer. Repeat 3 yrs (age 65), then if neg may stop pap screening.     Bone density scan (2010)      Comments:      Normal - Stable     DEXA - Dual energy X-ray photon absorptiometry (2010)      Comments:      Normal. Repeat 5 yrs. (Due )     HPV - Human papillomavirus test negative (2010)      Comments:      Low risk for cervical cancer. OK to have pap q 3 yrs.     Colonoscopy (2007)      Comments:      Normal. Repeat 10 yrs if no new risk factors. (Due )      Pt received IV sedation.     Breast biopsy sample (1998)      section ()  Medications        ibuprofen 600 mg oral tablet: 600 mg, 1 tab(s), po, q8 hrs, PRN: as needed for pain, 45 tab(s), 2 Refill(s).        Orthotics: See Instructions, Use as directed, 1 EA, 0 Refill(s).        estradiol-levonorgestrel 0.045 mg-0.015 mg/24 hr transdermal film, extended release: 1 patch(es), top, qweek, fill if decides to change from oral, 12 patch(es), 3 Refill(s).        Premarin 0.625 mg/g vaginal cream with applicator: 1 g, VAG, qPM, uses about 2x a week, 42.5 g, 3 Refill(s).        metroNIDAZOLE 0.75% topical gel: 1 annie, TOP, daily, 45 g, 3 Refill(s).        fluticasone 50 mcg/inh nasal spray: See  Instructions, INHALE 2 SPRAYS IN EACH NOSTRIL ONCE DAILY, 16 gm, 11 Refill(s).        Provera 10 mg oral tablet: 10 mg, 1 tab(s), po, daily, as directed by provider, 12 tab(s), 4 Refill(s).        conjugated estrogens 0.3 mg oral tablet: 0.3 mg, 1 tab(s), PO, Daily, 90 tab(s), 3 Refill(s).  Allergies    No Known Medication Allergies  Social History    Smoking Status - 12/04/2018     Never smoker     Alcohol - Current, 09/17/2010      Current, Wine (5 oz), 6 times per week, 1 drinks/episode average. 2 drinks/episode maximum., 10/28/2016     Employment and Education      Retired, 08/26/2015     Exercise and Physical Activity - Regular exercise, 09/17/2010      Exercise frequency: 5-6 times/week. Exercise type: Bicycling, Running, Swimming, Weight lifting, Yoga., 08/27/2015     Home and Environment      Spouse/Partner name: Gilberto Helga. 1 children., 08/26/2015     Nutrition and Health     Sexual      Sexually active: Yes., 02/24/2011     Substance Abuse - Denies Substance Abuse, 08/27/2015      Never, 08/26/2015     Tobacco - Denies Tobacco Use, 09/17/2010      Past, 08/27/2015  Family History    AIDS - Acquired immunodeficiency syndrome: Brother.    Alzheimer's disease: Grandmother (P).    Breast cancer: Mother (Dx at 87).    CVA - Cerebrovascular accident: Brother (Dx at 58).    Dementia: Mother.    Diabetes mellitus: Father.    Sarcoidosis: Father.    Stroke: Mother (Dx at 74).  Immunizations      Vaccine Date Status      pneumococcal (PCV13) 06/06/2018 Given      pneumococcal (PCV13) 11/09/2016 Given      influenza virus vaccine, inactivated 10/11/2016 Given      tetanus/diphth/pertuss (Tdap) adult/adol 05/26/2016 Recorded      pneumococcal (PPSV23) 10/27/2015 Given      influenza virus vaccine, inactivated 10/27/2015 Given      influenza virus vaccine, inactivated 01/05/2015 Given      influenza virus vaccine, inactivated 11/22/2013 Given      influenza virus vaccine, inactivated 11/20/2012 Given       influenza virus vaccine, inactivated 10/20/2011 Given      influenza virus vaccine, inactivated 10/20/2011 Given      influenza 11/29/2010 Given      influenza, H1N1, inactivated 02/04/2010 Recorded      hepatitis A adult vaccine 10/02/2007 Recorded      Hep B 06/05/2007 Recorded      Hep B 09/22/2006 Recorded      typhoid, inactivated 08/30/2006 Recorded      IPV 08/30/2006 Recorded      Hep B 08/25/2006 Recorded      hepatitis A adult vaccine 08/25/2006 Recorded      tetanus/diphth/pertuss (Tdap) adult/adol 08/01/2006 Recorded  Diagnostic Results   Chest x-ray reports a faint medial basilar infiltrate on the right.

## 2022-02-15 NOTE — TELEPHONE ENCOUNTER
---------------------  From: Kayla Park RN (Phone Messages Pool ([id number] - [place]))   Sent: 8/20/2021 9:27:42 AM CDT  Subject: PT referral     Time of Call:  0914       Person Calling:  patient  Phone number:  [phone]    Note:   Patient requests a referral to [facility] PT for left and right arm pain. Her PT person felt it might be related to her shoulders.  She has had this pain off and on since Sept. 20202. It started out as just an annoyance but now it has gotten much worse. She does not recall any injury. She has been  seen by PT for her neck but it has not helped her shoulders and arms. I have asked that she be seen since she has not had her shoulder and arm pain assessed. She agrees and is transferred to scheduling.    Last office visit and reason:  7/26/21 neck pain

## 2022-02-15 NOTE — TELEPHONE ENCOUNTER
Entered by Snehal Quesada CMA on December 17, 2019 3:13:44 PM CST  ---------------------  From: Snehal Quesada CMA   To: [facility]    Sent: 12/17/2019 3:13:44 PM CST  Subject: Medication Management     ** Submitted: **  Order:fluticasone nasal (fluticasone 50 mcg/inh nasal spray)  2 spray(s)  Nasal  daily  Qty:  16 gm        Duration:  30 day(s)        Refills:  0          Substitutions Allowed     Route To Pharmacy - [facility]    Signed by Snehal Quesada CMA  12/17/2019 3:13:00 PM    ** Submitted: **  Complete:fluticasone nasal (fluticasone 50 mcg/inh nasal spray)   Signed by Snehal Quesada CMA  12/17/2019 3:13:00 PM    ** Not Approved:  **  fluticasone nasal (FLUTICASONE  NASAL SPRAY MG MIST(AEROSOL SPRAY))  INHALE 2 SPRAYS IN EACH NOSTRIL ONCE DAILY  Qty:  16 unknown unit        Days Supply:  0        Refills:  0          Substitutions Allowed     Route To Pharmacy - [facility]   Signed by Snehal Quesada CMA            ** Patient matched by Snehal Quesada CMA on 12/17/2019 3:09:37 PM CST **      ------------------------------------------  From: [facility]  To: Kirill Nj MD  Sent: December 17, 2019 2:37:12 PM CST  Subject: Medication Management  Due: December 18, 2019 2:37:12 PM CST    ** On Hold Pending Signature **  Drug: fluticasone nasal (Flonase 50 mcg/inh nasal spray)  INHALE 2 SPRAYS IN EACH NOSTRIL ONCE DAILY  Quantity: 16 unknown unit  Days Supply: 0  Refills: 0  Substitutions Allowed  Notes from Pharmacy:     Dispensed Drug: fluticasone nasal (fluticasone 50 mcg/inh nasal spray)  INHALE 2 SPRAYS IN EACH NOSTRIL ONCE DAILY  Quantity: 16 unknown unit  Days Supply: 0  Refills: 0  Substitutions Allowed  Notes from Pharmacy:   ------------------------------------------I called and spoke to pt. We discussed the need to see GJM for a px and fasting labs. She agreed and will call back to schedule. Filled for 30 days.     LR on 7/20/18 for 16gm and 11 refills. Last visit was on 12/4/18 for pneumonia.      RTC placed for letter to be sent in 1 mo if no appt made.

## 2022-02-15 NOTE — PROGRESS NOTES
Chief Complaint    Pt here for vaginal irritation with pain and urgency with urination x week.  History of Present Illness      Has had urethritis in the past and feels like a bladder infection. Many times gets better on its own.  Having dysuria.       Vulva has been irritated one week. Having burning sensation constantly. Thinks triggered by intercourse. Denies vaginal discharge and itching. Denies any genital rash.  Review of Systems      No fevers       No vomiting  Physical Exam   Vitals & Measurements    T: 97.4(Tympanic)  HR: 52(Peripheral)  RR: 16  BP: 116/64     HT: 63.5 in  WT: 122 lb  BMI: 21.27       General: No acute distress      Back: No CVA tenderness      Abdomen: Soft, nontender and nondistended       Gynecologic: Normal internal and external genitalia.  Minimal erythema on the labia minora.  No cervical motion tenderness.  No uterine or ovarian masses.  Cervix not visualized as patient did not want the speculum put into far.  Urethra does seem to have some prolapsed tissue or cyst.  It is smooth and pink colored.  Assessment/Plan       Urinary frequency         No obvious evidence of urinary tract infection and will monitor with urine culture pending. Did discuss possible urethral prolapse and follow up with Urology         Ordered:          Culture, Urine, Routine* ([facility]), Specimen Type: Urine (Clean Catch), Collection Date: 10/23/17 15:20:00 CDT          POC URINALYSIS, UA* ([facility]), Specimen Type: Urine, Collection Date: 10/23/17 15:20:00 CDT          Wet Prep Vaginal (Request), Priority: Urgent, Urinary frequency  Vaginitis                Vaginitis         Possible bacterial vaginosis and treat with metronidazole.  Will try Premarin cream as well.          Ordered:          Culture, Urine, Routine* ([facility]), Specimen Type: Urine (Clean Catch), Collection Date: 10/23/17 15:20:00 CDT          POC URINALYSIS, UA* ([facility]), Specimen Type: Urine, Collection Date: 10/23/17 15:20:00 CDT          Wet  Prep Vaginal (Request), Priority: Urgent, Urinary frequency  Vaginitis           Patient Information     Name:LUCHO GOLD      Address:      [address] [address]     Sex:Female     YOB: 1950     [phone]     MRN:[id number]     FIN:[id number]     Location:[facility]     Date of Service:10/23/2017      Primary Care Physician:       Kirill Nj MD, [phone]  Medications    conjugated estrogens 0.3 mg oral tablet, 0.3 mg= 1 tab(s), po, daily    fluticasone 50 mcg/inh nasal spray, 2 spray(s), nasal, daily, 5 refills    ibuprofen 600 mg oral tablet, 600 mg= 1 tab(s), po, q8 hrs, PRN, 2 refills    MetroGel 0.75% topical, 1 annie, top, daily, 3 refills    Orthotics, See Instructions    Premarin 0.625 mg/g vaginal cream with applicator, See Instructions, 3 refills    Provera 10 mg oral tablet, 10 mg= 1 tab(s), po, daily, 4 refills  Allergies    No Known Medication Allergies  Lab Results  Diagnostic Results   UA: WBC none and RBC 0-2 with few bacteria    Wet prep: Positive clue cells

## 2022-02-15 NOTE — PROGRESS NOTES
Patient:   LUCHO GOLD            MRN: [id number]            FIN: [id number]               Age:   71 years     Sex:  Female     :  1950   Associated Diagnoses:   Right shoulder pain   Author:   Kirill Nj MD      Visit Information      Date of Service: 10/18/2021 12:37 pm  Performing Location: [facility]  Encounter#: [id number]      Primary Care Provider (PCP):  Kirill Nj MD    NPI# [id number]      Referring Provider:  Kirill Nj MD    NPI# [id number]      Chief Complaint   10/18/2021 12:44 PM CDT  f/u right shoulder pain, PT not helping and pain getting worse and limited ROM        History of Present Illness   Started having right shoulder pain in 2021. Had right arm pain while reaching for coffee. Took ibuprofen for 2 weeks with some benefit. Has been going to Physical Therapy and pain increasing which is limiting the her raising her arm. Hard to lift her arm for yoga. Still able to swim.      Review of Systems   No weakness in the arm  No numbness      Health Status   Allergies:    Allergic Reactions (Selected)  No Known Medication Allergies   Medications:  (Selected)   Prescriptions  Prescribed  Orthotics: Orthotics, See Instructions, Instructions: Use as directed, Supply, # 1 EA, 0 Refill(s), Type: Maintenance  fluticasone 50 mcg/inh nasal spray: = 2 spray(s), Nasal, daily, # 16 gm, 11 Refill(s), Type: Maintenance, Pharmacy: [facility], due for visit for further refills, 2 spray(s) Nasal daily,x30 day(s)  ibuprofen 600 mg oral tablet: 1 tab(s) ( 600 mg ), po, q8 hrs, PRN: as needed for pain, # 45 tab(s), 2 Refill(s), Type: Maintenance, Pharmacy: [facility], 1 tab(s) po q8 hrs,PRN:as needed for pain  metroNIDAZOLE 0.75% topical gel: 1 annie, TOP, daily, # 45 g, 3 Refill(s), Type: Maintenance, Pharmacy: [facility], 1 annie Topical daily   Problem list:    All Problems  History of rosacea / SNOMED CT 1841769473 / Confirmed  Prediabetes / SNOMED CT 3969318748 /  Confirmed  Tobacco Abuse, in Remission / ICD-9-.1 / Confirmed  Urethral syndrome / SNOMED CT 78655431 / Confirmed  Inactive: ASCUS on PAP Smear / ICD-9-.01  Resolved: History of chicken pox / SNOMED CT 275305862  Canceled: Family history of diabetes mellitus type II / ICD-9-CM V18.0      Histories   Procedure history:    HPV - Human papillomavirus test negative (SNOMED CT 1172076395) on 2013 at 62 Years.  Comments:  2013 4:51 PM CDT - Marleen Sanz  Low risk for cervical cancer. Repeat 3 yrs (age 65), then if neg may stop pap screening.  Bone density scan (SNOMED CT 733077166) on 2010 at 59 Years.  Comments:  10/15/2010 9:54 AM CDT - Dayan Guzmán - Stable  DEXA - Dual energy X-ray photon absorptiometry (SNOMED CT 032144336) on 2010 at 59 Years.  Comments:  2011 10:33 AM Marleen Emerson  Normal. Repeat 5 yrs. (Due )  HPV - Human papillomavirus test negative (SNOMED CT 8523001084) on 2010 at 59 Years.  Comments:  2011 10:34 AM Marleen Emerson  Low risk for cervical cancer. OK to have pap q 3 yrs.  Colonoscopy (SNOMED CT 866092603) performed by Cristino Knight MD on 3/6/2007 at 56 Years.  Comments:  2015 3:33 PM CDT - Rachael Traylor MA  Pt received IV sedation.    2011 10:31 AM Marleen Emerson  Normal. Repeat 10 yrs if no new risk factors. (Due )  Breast biopsy sample (SNOMED CT 7220810880) in the month of 1998 at 47 Years.   section (SNOMED CT 67536669) in  at 36 Years.      Physical Examination   Vital Signs   10/18/2021 12:44 PM CDT Temperature Tympanic 97.7 DegF  LOW    Peripheral Pulse Rate 62 bpm    Pulse Site Radial artery    HR Method Manual    Systolic Blood Pressure 110 mmHg    Diastolic Blood Pressure 64 mmHg    Mean Arterial Pressure 79 mmHg    BP Site Right arm    BP Method Manual      Measurements from flowsheet : Measurements   10/18/2021 12:44 PM CDT Height  Measured - Standard 63.5 in    Height/Length Estimated 63.5    Weight Measured - Standard 121.1 lb    BSA 1.57 m2    Body Mass Index 21.11 kg/m2      General:  Alert and oriented, No acute distress.    Integumentary:  No rash.    Musculoskeletal: Right shoulder with good strength shoulder. Abduction limited to 90 degrees until pain occurs and able to push through it. Rotator cuff muscles with good strength. Impingement testing (Mendoza and Neers test normal). Speeds and Yergason maneuver negative       Impression and Plan   Diagnosis     Right shoulder pain (QZT99-YH M25.511).       MRI right shoulder and Orthopedic referral

## 2022-02-15 NOTE — TELEPHONE ENCOUNTER
---------------------  From: Kirill Nj MD   To: LUCHO GOLD    Sent: 10/21/2021 12:45:01 PM CDT  Subject: MRI Shoulder     Dearadha Madrigal    IMPRESSION:  1.  No evidence of rotator cuff tear. No acute injury.  2.  Tendinopathy in the proximal intra-articular portion of the long head of the biceps tendon.  3.  Small amount of fluid in the subacromial subdeltoid recess.    Werner Nj M.D.

## 2022-02-15 NOTE — PROGRESS NOTES
Patient:   LUCHO GOLD            MRN: [id number]            FIN: [id number]               Age:   69 years     Sex:  Female     :  1950   Associated Diagnoses:   Allergic rhinitis; History of rosacea; Menopausal state   Author:   Kirill Nj MD      Visit Information      Date of Service: 2020 02:18 pm  Performing Location: [facility]  Encounter#: [id number]      Primary Care Provider (PCP):  Kirill Nj MD    NPI# [id number]      Referring Provider:  Kirill Nj MD    NPI# [id number]      Chief Complaint   2020 2:22 PM CST     Patient presents with cough x3 weeks. Requesting PT for plantar faciitis in left foot.        History of Present Illness   Has not had plantar fascitis for many years. Began left foot 1-2 months ago. Stopped running and has helped. Using a night splint.     Swimming in the chlorine and river makes her sneeze. Uses flonase before swimming. Gets congested if does not use it.  Nose runs a lot and more in the morning. Sometimes goes thru a box of kleenex in a couple hours. Happens couple times a week.    Takes premarin everyday for hot flashes and vaginal dryness. Uses provera 3x a year.   Cough is resolving      Review of Systems   Constitutional:  No fever.    Respiratory:  No shortness of breath.    Cardiovascular:  No chest pain.    Gastrointestinal:  No vomiting.       Health Status   Allergies:    Allergic Reactions (Selected)  No Known Medication Allergies   Medications:  (Selected)   Prescriptions  Prescribed  Orthotics: Orthotics, See Instructions, Instructions: Use as directed, Supply, # 1 EA, 0 Refill(s), Type: Maintenance  Premarin 0.625 mg/g vaginal cream with applicator: ( 1 g ), VAG, qPM, Instructions: uses about 2x a week, # 42.5 g, 3 Refill(s), Type: Maintenance, Pharmacy: [facility], 1 gm vag qpm,Instr:uses about 2x a week  Provera 10 mg oral tablet: = 1 tab(s) ( 10 mg ), po, daily, Instructions: as directed by provider, # 12  tab(s), 4 Refill(s), Type: Maintenance, Pharmacy: [facility], 1 tab(s) Oral daily,Instr:as directed by provider  conjugated estrogens 0.3 mg oral tablet: = 1 tab(s) ( 0.3 mg ), PO, Daily, # 90 tab(s), 0 Refill(s), Type: Maintenance, Pharmacy: [facility], 1 tab(s) Oral daily  estradiol-levonorgestrel 0.045 mg-0.015 mg/24 hr transdermal film, extended release: 1 patch(es), top, qweek, Instructions: fill if decides to change from oral, # 12 patch(es), 3 Refill(s), Type: Maintenance, Pharmacy: [facility], 1 patch(es) top qweek,Instr:fill if decides to change from oral  fluticasone 50 mcg/inh nasal spray: = 2 spray(s), Nasal, daily, # 16 gm, 0 Refill(s), Type: Maintenance, Pharmacy: [facility], due for visit for further refills  ibuprofen 600 mg oral tablet: 1 tab(s) ( 600 mg ), po, q8 hrs, PRN: as needed for pain, # 45 tab(s), 2 Refill(s), Type: Maintenance, Pharmacy: [facility], 1 tab(s) po q8 hrs,PRN:as needed for pain  metroNIDAZOLE 0.75% topical gel: 1 annie, TOP, daily, # 45 g, 3 Refill(s), Type: Maintenance, Pharmacy: [facility], 1 annie top daily   Problem list:    All Problems  History of rosacea / SNOMED CT 3024424334 / Confirmed  Tobacco Abuse, in Remission / ICD-9-.1 / Confirmed  Urethral syndrome / SNOMED CT 05722082 / Confirmed  Inactive: ASCUS on PAP Smear / ICD-9-.01  Resolved: History of chicken pox / SNOMED CT 559237365  Canceled: Family history of diabetes mellitus type II / ICD-9-CM V18.0      Histories   Procedure history:    HPV - Human papillomavirus test negative (SNOMED CT 2686669440) on 5/30/2013 at 62 Years.  Comments:  6/6/2013 4:51 PM GEO - Jaison KOLB, Marleen  Low risk for cervical cancer. Repeat 3 yrs (age 65), then if neg may stop pap screening.  Bone density scan (SNOMED CT 236839785) on 4/20/2010 at 59 Years.  Comments:  10/15/2010 9:54 AM HILDAT - Dayan Guzmán  Normal - Stable  DEXA - Dual energy X-ray photon absorptiometry (SNOMED CT 192239781) on 4/14/2010 at 59  Years.  Comments:  2011 10:33 AM LES - Marleen Sanz  Normal. Repeat 5 yrs. (Due )  HPV - Human papillomavirus test negative (SNOMED CT 1007787722) on 2010 at 59 Years.  Comments:  2011 10:34 AM Marleen Emerson  Low risk for cervical cancer. OK to have pap q 3 yrs.  Colonoscopy (SNOMED CT 853515422) performed by Cristino Knight MD on 3/6/2007 at 56 Years.  Comments:  2015 3:33 PM CDT - Kymberly IRENE, Rachael  Pt received IV sedation.    2011 10:31 AM Marleen Emerson  Normal. Repeat 10 yrs if no new risk factors. (Due )  Breast biopsy sample (SNOMED CT 7065989007) in the month of 1998 at 47 Years.   section (SNOMED CT 24385935) in  at 36 Years.     Family History: Mom  age 89. Dad  age 63 Sarcoidosis of the liver  Social History:  (Gilberto)       Physical Examination   Vital Signs   2020 2:22 PM CST Temperature Tympanic 97.4 DegF  LOW    Peripheral Pulse Rate 56 bpm  LOW    Respiratory Rate 16 br/min    Systolic Blood Pressure 95 mmHg    Diastolic Blood Pressure 59 mmHg  LOW    Mean Arterial Pressure 71 mmHg    BP Site Right arm    BP Method Electronic    Oxygen Saturation 98 %      Measurements from flowsheet : Measurements   2020 2:22 PM CST Height/Length Estimated 63.5    Weight Measured - Standard 128 lb      General:  Alert and oriented, No acute distress.    Neck:  No lymphadenopathy.    Respiratory:  Lungs are clear to auscultation.    Cardiovascular:  Normal rate.    Gastrointestinal:  Soft, Non-tender.    Musculoskeletal:  Normal gait.    Psychiatric:  Normal judgment.    Breast: no masses or lumps  Mild tenderness just distal to distal calcaneus plantar aspect      Impression and Plan   Diagnosis     Allergic rhinitis (OFS13-GQ J30.89).     History of rosacea (RUP02-AZ Z87.2).     Menopausal state (NJK12-DU N95.1).       Allergic rhinits: uses flonase twice a week and claritin when needed  Rosacea:  refill metrogel no longer uses azelaic acid  Menopausal management: continue premarin and provera (transdermal patch fell off)  Colon cancer screening: Normal Colonoscopy 2007. Average risk and desires FIT testing (negative 2018)  Cardiac: arrange fasting labs  Plantar fasciitis: continue night splint and refer to physical therapy  Breast cancer screening: normal breast exam and discussed mammograms (plans to continue for now)  Immunizations: Td 2016. Pneumovax 2015. Prevnar 2018. Shingrix done  Cough: follow up if does not continue to resolve.

## 2022-02-15 NOTE — PROGRESS NOTES
Chief Complaint    c/o left inner foot pain since Tuesday getting worse  History of Present Illness       Running 4 days ago. Tuesday left foot a little sore and ran 4 more miles. Wednesday woke up and had a hard time walking. Hurt again this morning and had hard time walking and now feeling after ibuprofen. Hurts with pushing off.  Review of Systems       No weakness        No numbness or tingling  Physical Exam   Vitals & Measurements    T: 97  F (Tympanic)  HR: 60 (Peripheral)  BP: 100/60     HT: 63.5 in  HT: 63.5  WT: 123.4 lb  BMI: 21.51        General: No acute distress       Musculoskeletal: Normal gait with pes planus on the left.  Tender to palpation medial ankle around the arch.       Skin: No bruising or swelling       Neurologic: Tender with tapping in the tarsal tunnel  Assessment/Plan       Left foot pain: Possible tarsal tunnel syndrome.  Recommend ibuprofen for several days with food and arch support.  If not improving will come in for a walking boot feel x-rays indicated at this time.  Return if worse.  Patient Information     Name:LUCHO GOLD      Address:      [address] [id number]     Sex:Female     YOB: 1950     [phone]     Emergency Contact:AMANDA EMMANUEL     MRN:[id number]     FIN:[id number]     Location:[facility]     Date of Service:02/04/2021      Primary Care Physician:       Kirill Nj MD, [phone]      Attending Physician:       Kirill Nj MD, [phone]  Problem List/Past Medical History    Ongoing     ASCUS on PAP Smear     History of rosacea     Prediabetes     Tobacco Abuse, in Remission     Urethral syndrome    Historical     History of chicken pox  Medications    conjugated estrogens 0.3 mg oral tablet, 0.3 mg= 1 tab(s), Oral, daily, 3 refills    fluticasone 50 mcg/inh nasal spray, 2 spray(s), Nasal, daily, 11 refills    ibuprofen 600 mg oral tablet, 600 mg= 1 tab(s), Oral, q8 hrs,  PRN, 2 refills    metroNIDAZOLE 0.75% topical gel, 1 annie, Topical, daily, 3 refills    Orthotics, See Instructions    Premarin 0.625 mg/g vaginal cream with applicator, 1 gm, Vaginal, qpm, 3 refills    Provera 10 mg oral tablet, 10 mg= 1 tab(s), Oral, daily, 4 refills  Allergies    No Known Medication Allergies

## 2022-02-15 NOTE — LETTER
(Inserted Image. Unable to display)         March 05, 2020        LUCHO FOSTER   [address] [place], [place] [id number]        Dear LUCHO,    Thank you for selecting [facility] for your healthcare needs.    Our records indicate you are due for the following services:     Colon Cancer Screening Stool Cards ~ Stool cards were sent home with you within the past 3 months and have not been returned to us for testing. You may either drop off your stool cards at your clinic, or send them to us in the mail.     To schedule an appointment or if you have further questions, please contact your primary clinic:   [facility]       [phone]   [facility]       [phone]              [facility]     [phone]      Powered by [employer]    Sincerely,    Kirill Nj MD

## 2022-03-08 ENCOUNTER — TRANSFERRED RECORDS (OUTPATIENT)
Dept: HEALTH INFORMATION MANAGEMENT | Facility: CLINIC | Age: 72
End: 2022-03-08

## 2022-06-01 PROBLEM — R73.03 PREDIABETES: Status: ACTIVE | Noted: 2020-03-05

## 2022-06-01 PROBLEM — N34.3 URETHRAL SYNDROME: Status: ACTIVE | Noted: 2022-06-01

## 2022-06-01 PROBLEM — Z87.898 HISTORY OF DISEASE: Status: ACTIVE | Noted: 2022-06-01

## 2022-06-01 PROBLEM — F17.201 TOBACCO DEPENDENCE IN REMISSION: Status: ACTIVE | Noted: 2022-06-01

## 2022-06-01 RX ORDER — FLUTICASONE PROPIONATE 50 MCG
2 SPRAY, SUSPENSION (ML) NASAL DAILY
COMMUNITY
Start: 2021-10-18 | End: 2022-10-13

## 2022-06-14 ENCOUNTER — OFFICE VISIT (OUTPATIENT)
Dept: FAMILY MEDICINE | Facility: CLINIC | Age: 72
End: 2022-06-14
Payer: COMMERCIAL

## 2022-06-14 VITALS
HEART RATE: 52 BPM | DIASTOLIC BLOOD PRESSURE: 66 MMHG | TEMPERATURE: 98.7 F | BODY MASS INDEX: 20.69 KG/M2 | HEIGHT: 64 IN | SYSTOLIC BLOOD PRESSURE: 123 MMHG | WEIGHT: 121.2 LBS

## 2022-06-14 DIAGNOSIS — M75.21 BICEPS TENDONITIS, RIGHT: ICD-10-CM

## 2022-06-14 DIAGNOSIS — G89.29 CHRONIC RIGHT SHOULDER PAIN: Primary | ICD-10-CM

## 2022-06-14 DIAGNOSIS — M25.511 CHRONIC RIGHT SHOULDER PAIN: Primary | ICD-10-CM

## 2022-06-14 PROCEDURE — 99213 OFFICE O/P EST LOW 20 MIN: CPT | Performed by: FAMILY MEDICINE

## 2022-06-14 NOTE — PROGRESS NOTES
"  Assessment & Plan     Chronic right shoulder pain  Biceps tendonitis, right  Options discussed including injection of the biceps tendon, home exercise, and formal physical therapy.  Physical therapy has been ordered and if no improvement consider injection.  We have discussed slight changes in his swelling stroke to decrease risk of worsening medial epicondylitis  - Physical Therapy Referral; Future                 Return if symptoms worsen or fail to improve.    Kirill Jackson MD  [facility]    Maddie Ely is a 71 year old who presents for the following health issues  accompanied by her self.    Musculoskeletal Problem    History of Present Illness       Reason for visit:  Biceps tendonistis    She eats 2-3 servings of fruits and vegetables daily.She consumes 0 sweetened beverage(s) daily.She exercises with enough effort to increase her heart rate 30 to 60 minutes per day.  She exercises with enough effort to increase her heart rate 5 days per week.   She is taking medications regularly.       Discuss biceps tendonitis to right arm.  Denies any injury to arm but did receive cortisone injection in the past for similar issue.    She reports recently changing her ascending stroke.  Denies other injury.  She has had trouble with shoulders and biceps tendon in the past.        Review of Systems         Objective    /66 (BP Location: Right arm, Cuff Size: Adult Regular)   Pulse 52   Temp 98.7  F (37.1  C) (Tympanic)   Ht 1.613 m (5' 3.5\")   Wt 55 kg (121 lb 3.2 oz)   BMI 21.13 kg/m    Body mass index is 21.13 kg/m .  Physical Exam   General:  Alert and oriented, No acute distress.     Eye:  Normal conjunctiva.     HENT:  Normocephalic.     Neck:  Supple, Non-tender.     Respiratory:  Respirations are non-labored.     Cardiovascular:  Normal rate.     Musculoskeletal:  Upper extremity exam:    Shoulder: She has anterior tenderness especially over the biceps tendon " on the right.  Decreased external range of motion right greater than left, decreased internal rotation.  No rotator cuff weakness  Arm: Normal bulk and tone  Elbow: Normal range of motion, mild medial epicondyle tenderness  Forearm: Normal  Wrist: Normal  Neurologic:  Normal sensory, Normal motor function.     Psychiatric:  Cooperative, Appropriate mood & affect.

## 2022-06-21 ENCOUNTER — TRANSFERRED RECORDS (OUTPATIENT)
Dept: HEALTH INFORMATION MANAGEMENT | Facility: CLINIC | Age: 72
End: 2022-06-21

## 2022-10-19 ENCOUNTER — ALLIED HEALTH/NURSE VISIT (OUTPATIENT)
Dept: FAMILY MEDICINE | Facility: CLINIC | Age: 72
End: 2022-10-19
Payer: COMMERCIAL

## 2022-10-19 DIAGNOSIS — Z23 NEED FOR VACCINATION: Primary | ICD-10-CM

## 2022-10-19 PROCEDURE — 90662 IIV NO PRSV INCREASED AG IM: CPT

## 2022-10-19 PROCEDURE — G0008 ADMIN INFLUENZA VIRUS VAC: HCPCS

## 2022-10-19 PROCEDURE — 99207 PR NO CHARGE NURSE ONLY: CPT

## 2022-10-26 ENCOUNTER — OFFICE VISIT (OUTPATIENT)
Dept: FAMILY MEDICINE | Facility: CLINIC | Age: 72
End: 2022-10-26
Payer: COMMERCIAL

## 2022-10-26 VITALS
SYSTOLIC BLOOD PRESSURE: 123 MMHG | WEIGHT: 119.6 LBS | BODY MASS INDEX: 20.42 KG/M2 | DIASTOLIC BLOOD PRESSURE: 67 MMHG | HEIGHT: 64 IN | TEMPERATURE: 97.7 F | HEART RATE: 64 BPM

## 2022-10-26 DIAGNOSIS — M67.432 GANGLION CYST OF WRIST, LEFT: Primary | ICD-10-CM

## 2022-10-26 DIAGNOSIS — G89.29 CHRONIC RIGHT SHOULDER PAIN: ICD-10-CM

## 2022-10-26 DIAGNOSIS — M85.80 OSTEOPENIA, UNSPECIFIED LOCATION: ICD-10-CM

## 2022-10-26 DIAGNOSIS — N95.2 VAGINAL ATROPHY: ICD-10-CM

## 2022-10-26 DIAGNOSIS — J31.0 CHRONIC RHINITIS: ICD-10-CM

## 2022-10-26 DIAGNOSIS — M25.511 CHRONIC RIGHT SHOULDER PAIN: ICD-10-CM

## 2022-10-26 PROCEDURE — 99214 OFFICE O/P EST MOD 30 MIN: CPT | Performed by: FAMILY MEDICINE

## 2022-10-26 RX ORDER — FLUTICASONE PROPIONATE 50 MCG
2 SPRAY, SUSPENSION (ML) NASAL DAILY
Qty: 16 G | Refills: 1 | Status: SHIPPED | OUTPATIENT
Start: 2022-10-26

## 2022-10-26 RX ORDER — ESTRADIOL 10 UG/1
10 INSERT VAGINAL
Qty: 24 TABLET | Refills: 1 | Status: SHIPPED | OUTPATIENT
Start: 2022-10-27

## 2022-10-26 ASSESSMENT — LIFESTYLE VARIABLES
HOW OFTEN DO YOU HAVE A DRINK CONTAINING ALCOHOL: MONTHLY OR LESS
HOW MANY STANDARD DRINKS CONTAINING ALCOHOL DO YOU HAVE ON A TYPICAL DAY: 1 OR 2
SKIP TO QUESTIONS 9-10: 1
HOW OFTEN DO YOU HAVE SIX OR MORE DRINKS ON ONE OCCASION: NEVER
AUDIT-C TOTAL SCORE: 1

## 2022-10-26 NOTE — PROGRESS NOTES
Assessment & Plan     Ganglion cyst of wrist, left  Currently the cyst is asymptomatic.  We have discussed possible treatments including observation, aspiration, and surgery referral.  Since she is asymptomatic she prefers just to monitor this.    Vaginal atrophy  Has been treated with estrogen in the past.  She would like to trial estradiol vaginal tablets.  She uses twice weekly.  She has tolerated topical estrogen in the past.  - estradiol (VAGIFEM) 10 MCG TABS vaginal tablet; Place 1 tablet (10 mcg) vaginally twice a week    Chronic rhinitis  This seems to be worse with activity especially swimming.  She will use fluticasone nasal spray daily  - fluticasone (FLONASE) 50 MCG/ACT nasal spray; Spray 2 sprays into both nostrils daily    Osteopenia, unspecified location  Bone density testing ordered for next spring she will try to do this in conjunction with her mammogram and June or early July.  - DX Hip/Pelvis/Spine; Future    Chronic right shoulder pain  Shoulder pain is improved.  Physical therapy ordered  - Physical Therapy Referral; Future                 No follow-ups on file.    Kirill Jackson MD  [facility] [place]    Maddie Ely is a 72 year old accompanied by her self, presenting for the following health issues:  Derm Problem (C/o cysts on left wrist) and Establish Care (Establish care, needs orders for DEXA, FIT testing, discuss rx for estrogen)      History of Present Illness       Reason for visit:  Check up    She eats 2-3 servings of fruits and vegetables daily.She consumes 0 sweetened beverage(s) daily.She exercises with enough effort to increase her heart rate 30 to 60 minutes per day.  She exercises with enough effort to increase her heart rate 5 days per week.   She is taking medications regularly.     Discuss cysts on left wrist, noticed over the past couple of months.    Establish care.  Would like to discuss colon cancer screening, obtaining a prescription  "for estrogen and also DEXA order.        Review of Systems   Constitutional, HEENT, cardiovascular, pulmonary, gi and gu systems are negative, except as otherwise noted.      Objective    /67 (BP Location: Right arm, Patient Position: Sitting, Cuff Size: Adult Regular)   Pulse 64   Temp 97.7  F (36.5  C) (Tympanic)   Ht 1.613 m (5' 3.5\")   Wt 54.3 kg (119 lb 9.6 oz)   LMP  (LMP Unknown)   BMI 20.85 kg/m    Body mass index is 20.85 kg/m .  Physical Exam   General:  Alert and oriented, No acute distress.    Eye: Normal conjunctiva.     HENT:  Oral mucosa is moist.     Neck:  Supple.     Respiratory:  Respirations are non-labored.     Cardiovascular:  Normal rate   Musculoskeletal:  Normal gait.  10 mm cystic structure volar, ulnar aspect of the left wrist  Psychiatric:  Cooperative, Appropriate mood & affect, Normal judgment.                       "

## 2022-11-02 ENCOUNTER — ALLIED HEALTH/NURSE VISIT (OUTPATIENT)
Dept: FAMILY MEDICINE | Facility: CLINIC | Age: 72
End: 2022-11-02
Payer: COMMERCIAL

## 2022-11-02 DIAGNOSIS — Z23 NEED FOR VACCINATION: Primary | ICD-10-CM

## 2022-11-02 PROCEDURE — 99207 PR NO CHARGE NURSE ONLY: CPT

## 2022-11-02 PROCEDURE — 0134A COVID-19,PF,MODERNA BIVALENT: CPT

## 2022-11-02 PROCEDURE — 91313 COVID-19,PF,MODERNA BIVALENT: CPT

## 2022-12-01 ENCOUNTER — TRANSFERRED RECORDS (OUTPATIENT)
Dept: HEALTH INFORMATION MANAGEMENT | Facility: CLINIC | Age: 72
End: 2022-12-01

## 2022-12-22 ENCOUNTER — LAB (OUTPATIENT)
Dept: LAB | Facility: CLINIC | Age: 72
End: 2022-12-22
Payer: COMMERCIAL

## 2022-12-22 LAB — HEMOCCULT STL QL IA: NEGATIVE

## 2022-12-22 PROCEDURE — 82274 ASSAY TEST FOR BLOOD FECAL: CPT

## 2023-01-16 ENCOUNTER — TELEPHONE (OUTPATIENT)
Dept: SCHEDULING | Facility: CLINIC | Age: 73
End: 2023-01-16

## 2023-01-16 NOTE — TELEPHONE ENCOUNTER
Patient contacted and scheduled for an appointment with provider on 1-17-23 at 440pm    Manan Martin LPN on 1/16/2023 at 2:13 PM

## 2023-01-16 NOTE — TELEPHONE ENCOUNTER
Reason for Call:  Appointment Request    Patient requesting this type of appt:  left knee    Requested provider: Dr. Jackson     Reason patient unable to be scheduled: Not within requested timeframe    When does patient want to be seen/preferred time: Same day    Comments: Patient would like to get an appt with Dr. Jackson asa. Declined appt with partner.     Okay to leave a detailed message?: Yes at Home number on file [phone] (home)    Call taken on 1/16/2023 at 1:17 PM by Liza Bragg

## 2023-01-17 ENCOUNTER — OFFICE VISIT (OUTPATIENT)
Dept: FAMILY MEDICINE | Facility: CLINIC | Age: 73
End: 2023-01-17
Payer: COMMERCIAL

## 2023-01-17 VITALS
HEIGHT: 64 IN | HEART RATE: 62 BPM | DIASTOLIC BLOOD PRESSURE: 81 MMHG | SYSTOLIC BLOOD PRESSURE: 160 MMHG | BODY MASS INDEX: 20.69 KG/M2 | RESPIRATION RATE: 20 BRPM | WEIGHT: 121.2 LBS | OXYGEN SATURATION: 99 % | TEMPERATURE: 97 F

## 2023-01-17 DIAGNOSIS — M25.562 ACUTE PAIN OF LEFT KNEE: Primary | ICD-10-CM

## 2023-01-17 DIAGNOSIS — M81.0 AGE-RELATED OSTEOPOROSIS WITHOUT CURRENT PATHOLOGICAL FRACTURE: ICD-10-CM

## 2023-01-17 PROCEDURE — 99213 OFFICE O/P EST LOW 20 MIN: CPT | Performed by: FAMILY MEDICINE

## 2023-01-17 NOTE — PROGRESS NOTES
"  Assessment & Plan     Acute pain of left knee  Suspect mild strain of the medial collateral ligament with small Baker's cyst.  She will continue to rest the knee as needed.  Follow-up if symptoms worsen    Age-related osteoporosis without current pathological fracture  Bone density scan ordered  - DX Hip/Pelvis/Spine; Future                 No follow-ups on file.    Kirill Jackson MD  [facility]    Subjective   Jhoana is a 72 year old accompanied by her self, presenting for the following health issues:  Musculoskeletal Problem (Left knee pain)      History of Present Illness       Reason for visit:  Left knee  Symptom onset:  1-2 weeks ago  Symptoms include:  Left knee    She eats 2-3 servings of fruits and vegetables daily.She consumes 0 sweetened beverage(s) daily.She exercises with enough effort to increase her heart rate 30 to 60 minutes per day.  She exercises with enough effort to increase her heart rate 4 days per week.   She is taking medications regularly.       Patient is here to discuss left knee pain that started about 1 week ago.  She denies any injury to her knee, may have had some swelling to posterior of knee along with some stiffness.    Also discuss bone density results.        Review of Systems   Constitutional, HEENT, cardiovascular, pulmonary, gi and gu systems are negative, except as otherwise noted.      Objective    BP (!) 160/81 (BP Location: Right arm, Patient Position: Sitting, Cuff Size: Adult Regular)   Pulse 62   Temp 97  F (36.1  C) (Tympanic)   Resp 20   Ht 1.613 m (5' 3.5\")   Wt 55 kg (121 lb 3.2 oz)   LMP  (LMP Unknown)   SpO2 99%   BMI 21.13 kg/m    Body mass index is 21.13 kg/m .  Physical Exam   Alert, oriented, no acute distress  Normal heart rate  Nonlabored breathing  Exam of the lower extremities reveals normal range of motion of hips and knees.  She has mild medial joint line tenderness on the left with mild posterior tenderness. "  There is no instability.  Negative Dominique's, negative patellar apprehension

## 2023-01-19 ENCOUNTER — ANCILLARY PROCEDURE (OUTPATIENT)
Dept: BONE DENSITY | Facility: CLINIC | Age: 73
End: 2023-01-19
Attending: FAMILY MEDICINE
Payer: COMMERCIAL

## 2023-01-19 DIAGNOSIS — M81.0 AGE-RELATED OSTEOPOROSIS WITHOUT CURRENT PATHOLOGICAL FRACTURE: ICD-10-CM

## 2023-01-19 PROCEDURE — 77080 DXA BONE DENSITY AXIAL: CPT | Mod: TC | Performed by: STUDENT IN AN ORGANIZED HEALTH CARE EDUCATION/TRAINING PROGRAM

## 2023-02-12 ENCOUNTER — HEALTH MAINTENANCE LETTER (OUTPATIENT)
Age: 73
End: 2023-02-12

## 2024-03-10 ENCOUNTER — HEALTH MAINTENANCE LETTER (OUTPATIENT)
Age: 74
End: 2024-03-10

## 2024-05-23 ENCOUNTER — PATIENT OUTREACH (OUTPATIENT)
Dept: FAMILY MEDICINE | Facility: CLINIC | Age: 74
End: 2024-05-23
Payer: COMMERCIAL

## 2024-05-23 NOTE — TELEPHONE ENCOUNTER
Patient Quality Outreach    Patient is due for the following:   Physical Annual Wellness Visit    Next Steps:   No follow up needed at this time.    Type of outreach:    Chart review performed, no outreach needed.  Pt has transferred care elsewhere due to moving to [place]    Questions for provider review:    None           Rachael Traylor MA

## 2025-01-19 ENCOUNTER — HEALTH MAINTENANCE LETTER (OUTPATIENT)
Age: 75
End: 2025-01-19

## 2025-03-16 ENCOUNTER — HEALTH MAINTENANCE LETTER (OUTPATIENT)
Age: 75
End: 2025-03-16